# Patient Record
Sex: MALE | Race: BLACK OR AFRICAN AMERICAN | NOT HISPANIC OR LATINO | Employment: OTHER | URBAN - METROPOLITAN AREA
[De-identification: names, ages, dates, MRNs, and addresses within clinical notes are randomized per-mention and may not be internally consistent; named-entity substitution may affect disease eponyms.]

---

## 2018-01-21 ENCOUNTER — HOSPITAL ENCOUNTER (INPATIENT)
Facility: OTHER | Age: 68
LOS: 2 days | Discharge: HOME | DRG: 639 | End: 2018-01-24
Attending: EMERGENCY MEDICINE | Admitting: HOSPITALIST
Payer: MEDICARE

## 2018-01-21 DIAGNOSIS — R56.9 SEIZURE: ICD-10-CM

## 2018-01-21 DIAGNOSIS — R53.1 WEAKNESS: ICD-10-CM

## 2018-01-21 DIAGNOSIS — G40.909 SEIZURE DISORDER: ICD-10-CM

## 2018-01-21 DIAGNOSIS — T79.7XXA SUBCUTANEOUS EMPHYSEMA, INITIAL ENCOUNTER: Primary | ICD-10-CM

## 2018-01-21 DIAGNOSIS — D64.9 ANEMIA, UNSPECIFIED TYPE: ICD-10-CM

## 2018-01-21 DIAGNOSIS — E16.2 HYPOGLYCEMIA: ICD-10-CM

## 2018-01-21 DIAGNOSIS — E11.8 TYPE 2 DIABETES MELLITUS WITH COMPLICATION, WITHOUT LONG-TERM CURRENT USE OF INSULIN: ICD-10-CM

## 2018-01-21 DIAGNOSIS — R80.9 PROTEINURIA, UNSPECIFIED TYPE: ICD-10-CM

## 2018-01-21 PROBLEM — E78.5 HYPERLIPIDEMIA: Status: ACTIVE | Noted: 2018-01-21

## 2018-01-21 PROBLEM — D50.9 IRON DEFICIENCY ANEMIA: Status: ACTIVE | Noted: 2018-01-21

## 2018-01-21 PROBLEM — E11.9 TYPE 2 DIABETES MELLITUS: Status: ACTIVE | Noted: 2018-01-21

## 2018-01-21 PROBLEM — I95.9 HYPOTENSION: Status: ACTIVE | Noted: 2018-01-21

## 2018-01-21 LAB
ALBUMIN SERPL BCP-MCNC: 2.6 G/DL
ALP SERPL-CCNC: 67 U/L
ALT SERPL W/O P-5'-P-CCNC: 16 U/L
ANION GAP SERPL CALC-SCNC: 10 MMOL/L
ANISOCYTOSIS BLD QL SMEAR: SLIGHT
AST SERPL-CCNC: 26 U/L
BACTERIA #/AREA URNS HPF: NORMAL /HPF
BASOPHILS # BLD AUTO: 0.01 K/UL
BASOPHILS NFR BLD: 0.2 %
BILIRUB SERPL-MCNC: 0.1 MG/DL
BILIRUB UR QL STRIP: NEGATIVE
BNP SERPL-MCNC: 64 PG/ML
BUN SERPL-MCNC: 15 MG/DL
BURR CELLS BLD QL SMEAR: ABNORMAL
CALCIUM SERPL-MCNC: 8.9 MG/DL
CHLORIDE SERPL-SCNC: 109 MMOL/L
CLARITY UR: CLEAR
CO2 SERPL-SCNC: 24 MMOL/L
COLOR UR: YELLOW
CREAT SERPL-MCNC: 0.8 MG/DL
DIFFERENTIAL METHOD: ABNORMAL
EOSINOPHIL # BLD AUTO: 0.2 K/UL
EOSINOPHIL NFR BLD: 4 %
ERYTHROCYTE [DISTWIDTH] IN BLOOD BY AUTOMATED COUNT: 18.6 %
EST. GFR  (AFRICAN AMERICAN): >60 ML/MIN/1.73 M^2
EST. GFR  (NON AFRICAN AMERICAN): >60 ML/MIN/1.73 M^2
GIANT PLATELETS BLD QL SMEAR: PRESENT
GLUCOSE SERPL-MCNC: 53 MG/DL
GLUCOSE UR QL STRIP: ABNORMAL
HCT VFR BLD AUTO: 28.6 %
HGB BLD-MCNC: 9 G/DL
HGB UR QL STRIP: NEGATIVE
HYALINE CASTS #/AREA URNS LPF: 0 /LPF
HYPOCHROMIA BLD QL SMEAR: ABNORMAL
INR PPP: 1
KETONES UR QL STRIP: NEGATIVE
LEUKOCYTE ESTERASE UR QL STRIP: NEGATIVE
LYMPHOCYTES # BLD AUTO: 3 K/UL
LYMPHOCYTES NFR BLD: 52.6 %
MCH RBC QN AUTO: 23.8 PG
MCHC RBC AUTO-ENTMCNC: 31.5 G/DL
MCV RBC AUTO: 76 FL
MICROSCOPIC COMMENT: NORMAL
MONOCYTES # BLD AUTO: 0.5 K/UL
MONOCYTES NFR BLD: 8.9 %
NEUTROPHILS # BLD AUTO: 2 K/UL
NEUTROPHILS NFR BLD: 34.3 %
NITRITE UR QL STRIP: NEGATIVE
OVALOCYTES BLD QL SMEAR: ABNORMAL
PH UR STRIP: 7 [PH] (ref 5–8)
PLATELET # BLD AUTO: 147 K/UL
PLATELET BLD QL SMEAR: ABNORMAL
PMV BLD AUTO: 10.3 FL
POCT GLUCOSE: 113 MG/DL (ref 70–110)
POCT GLUCOSE: 142 MG/DL (ref 70–110)
POCT GLUCOSE: 196 MG/DL (ref 70–110)
POCT GLUCOSE: 53 MG/DL (ref 70–110)
POCT GLUCOSE: 66 MG/DL (ref 70–110)
POIKILOCYTOSIS BLD QL SMEAR: ABNORMAL
POLYCHROMASIA BLD QL SMEAR: ABNORMAL
POTASSIUM SERPL-SCNC: 4.3 MMOL/L
PROT SERPL-MCNC: 6.6 G/DL
PROT UR QL STRIP: ABNORMAL
PROTHROMBIN TIME: 11 SEC
RBC # BLD AUTO: 3.78 M/UL
RBC #/AREA URNS HPF: 0 /HPF (ref 0–4)
SCHISTOCYTES BLD QL SMEAR: ABNORMAL
SCHISTOCYTES BLD QL SMEAR: PRESENT
SODIUM SERPL-SCNC: 143 MMOL/L
SP GR UR STRIP: 1.02 (ref 1–1.03)
SPHEROCYTES BLD QL SMEAR: ABNORMAL
TROPONIN I SERPL DL<=0.01 NG/ML-MCNC: <0.006 NG/ML
URN SPEC COLLECT METH UR: ABNORMAL
UROBILINOGEN UR STRIP-ACNC: 1 EU/DL
VALPROATE SERPL-MCNC: 49 UG/ML
WBC # BLD AUTO: 5.76 K/UL
WBC #/AREA URNS HPF: 2 /HPF (ref 0–5)

## 2018-01-21 PROCEDURE — G0378 HOSPITAL OBSERVATION PER HR: HCPCS

## 2018-01-21 PROCEDURE — 82962 GLUCOSE BLOOD TEST: CPT

## 2018-01-21 PROCEDURE — 93005 ELECTROCARDIOGRAM TRACING: CPT

## 2018-01-21 PROCEDURE — 93010 ELECTROCARDIOGRAM REPORT: CPT | Mod: ,,, | Performed by: INTERNAL MEDICINE

## 2018-01-21 PROCEDURE — 83880 ASSAY OF NATRIURETIC PEPTIDE: CPT

## 2018-01-21 PROCEDURE — 63600175 PHARM REV CODE 636 W HCPCS: Performed by: NURSE PRACTITIONER

## 2018-01-21 PROCEDURE — 25000003 PHARM REV CODE 250: Performed by: NURSE PRACTITIONER

## 2018-01-21 PROCEDURE — 85610 PROTHROMBIN TIME: CPT

## 2018-01-21 PROCEDURE — 99223 1ST HOSP IP/OBS HIGH 75: CPT | Mod: AI,,, | Performed by: HOSPITALIST

## 2018-01-21 PROCEDURE — 80164 ASSAY DIPROPYLACETIC ACD TOT: CPT

## 2018-01-21 PROCEDURE — 99285 EMERGENCY DEPT VISIT HI MDM: CPT | Mod: 25

## 2018-01-21 PROCEDURE — 84484 ASSAY OF TROPONIN QUANT: CPT

## 2018-01-21 PROCEDURE — 25000003 PHARM REV CODE 250: Performed by: EMERGENCY MEDICINE

## 2018-01-21 PROCEDURE — 99220 PR INITIAL OBSERVATION CARE,LEVL III: CPT | Mod: ,,, | Performed by: NURSE PRACTITIONER

## 2018-01-21 PROCEDURE — 25000003 PHARM REV CODE 250

## 2018-01-21 PROCEDURE — 85025 COMPLETE CBC W/AUTO DIFF WBC: CPT

## 2018-01-21 PROCEDURE — 80053 COMPREHEN METABOLIC PANEL: CPT

## 2018-01-21 PROCEDURE — 96374 THER/PROPH/DIAG INJ IV PUSH: CPT

## 2018-01-21 PROCEDURE — 81000 URINALYSIS NONAUTO W/SCOPE: CPT

## 2018-01-21 RX ORDER — AMOXICILLIN 250 MG
1 CAPSULE ORAL 2 TIMES DAILY
Status: DISCONTINUED | OUTPATIENT
Start: 2018-01-21 | End: 2018-01-24 | Stop reason: HOSPADM

## 2018-01-21 RX ORDER — ESCITALOPRAM OXALATE 10 MG/1
10 TABLET ORAL DAILY
COMMUNITY

## 2018-01-21 RX ORDER — IBUPROFEN 200 MG
16 TABLET ORAL
Status: DISCONTINUED | OUTPATIENT
Start: 2018-01-21 | End: 2018-01-24 | Stop reason: HOSPADM

## 2018-01-21 RX ORDER — ENOXAPARIN SODIUM 100 MG/ML
40 INJECTION SUBCUTANEOUS EVERY 24 HOURS
Status: DISCONTINUED | OUTPATIENT
Start: 2018-01-21 | End: 2018-01-24 | Stop reason: HOSPADM

## 2018-01-21 RX ORDER — PANTOPRAZOLE SODIUM 40 MG/1
40 TABLET, DELAYED RELEASE ORAL DAILY
Status: DISCONTINUED | OUTPATIENT
Start: 2018-01-22 | End: 2018-01-24 | Stop reason: HOSPADM

## 2018-01-21 RX ORDER — ONDANSETRON 2 MG/ML
4 INJECTION INTRAMUSCULAR; INTRAVENOUS EVERY 8 HOURS PRN
Status: DISCONTINUED | OUTPATIENT
Start: 2018-01-21 | End: 2018-01-24 | Stop reason: HOSPADM

## 2018-01-21 RX ORDER — ROSUVASTATIN CALCIUM 40 MG/1
10 TABLET, COATED ORAL NIGHTLY
COMMUNITY

## 2018-01-21 RX ORDER — DEXTROSE MONOHYDRATE, SODIUM CHLORIDE, AND POTASSIUM CHLORIDE 50; 1.49; 9 G/1000ML; G/1000ML; G/1000ML
INJECTION, SOLUTION INTRAVENOUS CONTINUOUS
Status: DISCONTINUED | OUTPATIENT
Start: 2018-01-21 | End: 2018-01-24

## 2018-01-21 RX ORDER — SODIUM CHLORIDE 0.9 % (FLUSH) 0.9 %
5 SYRINGE (ML) INJECTION
Status: DISCONTINUED | OUTPATIENT
Start: 2018-01-21 | End: 2018-01-24 | Stop reason: HOSPADM

## 2018-01-21 RX ORDER — IBUPROFEN 200 MG
24 TABLET ORAL
Status: DISCONTINUED | OUTPATIENT
Start: 2018-01-21 | End: 2018-01-24 | Stop reason: HOSPADM

## 2018-01-21 RX ORDER — DIVALPROEX SODIUM 500 MG/1
500 TABLET, FILM COATED, EXTENDED RELEASE ORAL DAILY
COMMUNITY

## 2018-01-21 RX ORDER — HYDROCODONE BITARTRATE AND ACETAMINOPHEN 7.5; 325 MG/1; MG/1
1 TABLET ORAL EVERY 6 HOURS PRN
COMMUNITY

## 2018-01-21 RX ORDER — ESCITALOPRAM OXALATE 10 MG/1
10 TABLET ORAL DAILY
Status: DISCONTINUED | OUTPATIENT
Start: 2018-01-22 | End: 2018-01-24 | Stop reason: HOSPADM

## 2018-01-21 RX ORDER — ASPIRIN 81 MG/1
81 TABLET ORAL DAILY
COMMUNITY

## 2018-01-21 RX ORDER — PANTOPRAZOLE SODIUM 40 MG/1
40 TABLET, DELAYED RELEASE ORAL DAILY
COMMUNITY

## 2018-01-21 RX ORDER — MIDODRINE HYDROCHLORIDE 2.5 MG/1
2.5 TABLET ORAL 3 TIMES DAILY
COMMUNITY

## 2018-01-21 RX ORDER — CYPROHEPTADINE HYDROCHLORIDE 4 MG/1
4 TABLET ORAL 3 TIMES DAILY PRN
COMMUNITY

## 2018-01-21 RX ORDER — DIVALPROEX SODIUM 500 MG/1
500 TABLET, FILM COATED, EXTENDED RELEASE ORAL DAILY
Status: DISCONTINUED | OUTPATIENT
Start: 2018-01-22 | End: 2018-01-24 | Stop reason: HOSPADM

## 2018-01-21 RX ORDER — TAMSULOSIN HYDROCHLORIDE 0.4 MG/1
0.4 CAPSULE ORAL DAILY
COMMUNITY

## 2018-01-21 RX ORDER — HYDROCODONE BITARTRATE AND ACETAMINOPHEN 7.5; 325 MG/1; MG/1
1 TABLET ORAL EVERY 6 HOURS PRN
Status: DISCONTINUED | OUTPATIENT
Start: 2018-01-21 | End: 2018-01-24 | Stop reason: HOSPADM

## 2018-01-21 RX ORDER — TAMSULOSIN HYDROCHLORIDE 0.4 MG/1
0.4 CAPSULE ORAL DAILY
Status: DISCONTINUED | OUTPATIENT
Start: 2018-01-22 | End: 2018-01-24 | Stop reason: HOSPADM

## 2018-01-21 RX ORDER — FERROUS SULFATE 325(65) MG
325 TABLET, DELAYED RELEASE (ENTERIC COATED) ORAL DAILY
Status: DISCONTINUED | OUTPATIENT
Start: 2018-01-22 | End: 2018-01-24 | Stop reason: HOSPADM

## 2018-01-21 RX ORDER — INSULIN ASPART 100 [IU]/ML
1-10 INJECTION, SOLUTION INTRAVENOUS; SUBCUTANEOUS
Status: DISCONTINUED | OUTPATIENT
Start: 2018-01-21 | End: 2018-01-23

## 2018-01-21 RX ORDER — GLUCAGON 1 MG
1 KIT INJECTION
Status: DISCONTINUED | OUTPATIENT
Start: 2018-01-21 | End: 2018-01-24 | Stop reason: HOSPADM

## 2018-01-21 RX ORDER — MIDODRINE HYDROCHLORIDE 2.5 MG/1
2.5 TABLET ORAL 3 TIMES DAILY
Status: DISCONTINUED | OUTPATIENT
Start: 2018-01-21 | End: 2018-01-24 | Stop reason: HOSPADM

## 2018-01-21 RX ORDER — GABAPENTIN 300 MG/1
300 CAPSULE ORAL 3 TIMES DAILY
Status: DISCONTINUED | OUTPATIENT
Start: 2018-01-21 | End: 2018-01-24 | Stop reason: HOSPADM

## 2018-01-21 RX ORDER — ROSUVASTATIN CALCIUM 10 MG/1
10 TABLET, COATED ORAL NIGHTLY
Status: DISCONTINUED | OUTPATIENT
Start: 2018-01-21 | End: 2018-01-24 | Stop reason: HOSPADM

## 2018-01-21 RX ORDER — GABAPENTIN 300 MG/1
300 CAPSULE ORAL DAILY
COMMUNITY

## 2018-01-21 RX ORDER — ACETAMINOPHEN 325 MG/1
650 TABLET ORAL EVERY 4 HOURS PRN
Status: DISCONTINUED | OUTPATIENT
Start: 2018-01-21 | End: 2018-01-24 | Stop reason: HOSPADM

## 2018-01-21 RX ORDER — DIVALPROEX SODIUM 500 MG/1
1000 TABLET, FILM COATED, EXTENDED RELEASE ORAL NIGHTLY
Status: DISCONTINUED | OUTPATIENT
Start: 2018-01-21 | End: 2018-01-24 | Stop reason: HOSPADM

## 2018-01-21 RX ORDER — ASPIRIN 81 MG/1
81 TABLET ORAL DAILY
Status: DISCONTINUED | OUTPATIENT
Start: 2018-01-22 | End: 2018-01-24 | Stop reason: HOSPADM

## 2018-01-21 RX ORDER — DEXTROSE 50 % IN WATER (D50W) INTRAVENOUS SYRINGE
Status: COMPLETED
Start: 2018-01-21 | End: 2018-01-21

## 2018-01-21 RX ORDER — QUINIDINE GLUCONATE 324 MG
240 TABLET, EXTENDED RELEASE ORAL 2 TIMES DAILY
COMMUNITY

## 2018-01-21 RX ORDER — METFORMIN HYDROCHLORIDE 1000 MG/1
1000 TABLET ORAL 2 TIMES DAILY WITH MEALS
Status: ON HOLD | COMMUNITY
End: 2018-01-24 | Stop reason: HOSPADM

## 2018-01-21 RX ADMIN — STANDARDIZED SENNA CONCENTRATE AND DOCUSATE SODIUM 1 TABLET: 8.6; 5 TABLET, FILM COATED ORAL at 10:01

## 2018-01-21 RX ADMIN — GABAPENTIN 300 MG: 300 CAPSULE ORAL at 10:01

## 2018-01-21 RX ADMIN — ENOXAPARIN SODIUM 40 MG: 100 INJECTION SUBCUTANEOUS at 10:01

## 2018-01-21 RX ADMIN — MIDODRINE HYDROCHLORIDE 2.5 MG: 2.5 TABLET ORAL at 10:01

## 2018-01-21 RX ADMIN — DEXTROSE MONOHYDRATE 50 ML: 25 INJECTION, SOLUTION INTRAVENOUS at 02:01

## 2018-01-21 RX ADMIN — DEXTROSE MONOHYDRATE 12.5 G: 25 INJECTION, SOLUTION INTRAVENOUS at 05:01

## 2018-01-21 RX ADMIN — POTASSIUM CHLORIDE, DEXTROSE MONOHYDRATE AND SODIUM CHLORIDE: 150; 5; 900 INJECTION, SOLUTION INTRAVENOUS at 10:01

## 2018-01-21 RX ADMIN — DIVALPROEX SODIUM 1000 MG: 500 TABLET, FILM COATED, EXTENDED RELEASE ORAL at 10:01

## 2018-01-21 NOTE — ED TRIAGE NOTES
Pt's wife reports that their son stated that the pt was confused, weak & unable to stand. Pt states that he remembers being on the floor, does not remember why he was on the floor. Last memory pt reports is attempting to stand. Pt's wife reports that she took his blood pressure when she returned home & pt's BP = 75 systolic with slurred speech. Pt's wife reports that pt has seizures. Pt c/o dizziness. Pt also c/o lower back & bilateral lower extremity pain.

## 2018-01-21 NOTE — ED NOTES
"Pt appears aaox4, speaking in clear, full sentences and able to follow commands at this time. Pt states," I feel so much better,. I didn't know my sugar was that low, I don't think it's been that low before". Pt remains on cardiac monitor, continuous pulse oximetry and automatic blood pressure cuff cycling w/ alarms set. Wife remains at bedside w/ pt.Bed placed in low locked position, side rails up x 2, call light is within reach of patient or family, alarms set and turned on for monitor and pulse ox, will continue to monitor    "

## 2018-01-21 NOTE — ED NOTES
"Pt sitting up talking with family member at bedside. Pt appears aaox4, speaking with clear full sentences at this time. " I just feel so much better. I am hungry though". Awaiting further orders from MD at this time. Will continue to monitor pt. Wife remains at bedside. Pt remains on cardiac monitor, continuous pulse oximetry and automatic blood pressure cuff cycling w/ alarms set. Bed placed in low locked position, side rails up x 2, call light is within reach of patient or family, alarms set and turned on for monitor and pulse ox, will continue to monitor.     "

## 2018-01-21 NOTE — ED NOTES
MD aware of pt's current CBG 66, per MD given PO [place] [employer] at this time. MD placing diet order for pt.

## 2018-01-21 NOTE — ED NOTES
Dr. Toney at bedside speaking with patient and family about current results and updated plan of care.

## 2018-01-21 NOTE — ED NOTES
Patient identifiers verified and correct for Campbell Costa.    LOC: The patient is awake, alert and aware of environment with an appropriate affect, the patient is oriented x 3 and speaking appropriately.  APPEARANCE: Patient resting comfortably and in no acute distress, patient is clean and well groomed, patient's clothing is properly fastened.  SKIN: The skin is warm and dry, color consistent with ethnicity, patient has normal skin turgor and moist mucus membranes, skin intact, no breakdown or bruising noted.  MUSCULOSKELETAL: Patient moving all extremities spontaneously, no obvious swelling or deformities noted.  RESPIRATORY: Airway is open and patent, respirations are spontaneous, patient has a normal effort and rate, no accessory muscle use noted, bilateral breath sounds clear in all lobes.  CARDIAC: Patient has a normal rate and regular rhythm, no periphreal edema noted, capillary refill < 3 seconds.  ABDOMEN: Soft and non tender to palpation, no distention noted.  NEUROLOGIC: PERRL,3mm bilaterally, eyes open spontaneously, behavior appropriate to situation, follows commands, facial expression symmetrical, bilateral hand grasp equal and even, purposeful motor response noted, normal sensation in all extremities when touched with a finger.

## 2018-01-21 NOTE — ED PROVIDER NOTES
Encounter Date: 1/21/2018    SCRIBE #1 NOTE: I, Loli Nash, am scribing for, and in the presence of, Dr. Ortez.       History     Chief Complaint   Patient presents with    Fatigue     starting today, chronic back pain and dizziness w/ fall x3 in last 7 months     Time seen by provider: 1:58 PM    This is a 67 y.o. male with a history of COPD, Dm, HTN, seizures, and stroke who presents with complaint of fatigue that began today. Patient reports wife found him on the floor. Patient is unsure of how he got there. Wife reports patient was confused after the episode. While he was on the floor, wife reports patient had low blood pressure (75/48). Wife reports patient seems less responsive that usual. He denies any fever, chills, congestion, cough, chest pain, nausea, vomiting, or dysuria. Wife reports giving the patient morning medications and a muscle relaxer for back pain prior to the episode. Patient reports keppra was increased about three weeks ago.         The history is provided by the patient.     Review of patient's allergies indicates:   Allergen Reactions    Lisinopril      Past Medical History:   Diagnosis Date    Anxiety     COPD (chronic obstructive pulmonary disease)     Diabetes mellitus     GERD (gastroesophageal reflux disease)     Hereditary acanthocytosis     Hypercholesterolemia     Hypertension     Iron deficiency anemia     Seizures     Stroke      Past Surgical History:   Procedure Laterality Date    CERVICAL FUSION      THYROIDECTOMY, PARTIAL Left     November 2017    TUMOR EXCISION      Fatty tumor excised from the right jaw     History reviewed. No pertinent family history.  Social History   Substance Use Topics    Smoking status: Former Smoker     Quit date: 1/21/2014    Smokeless tobacco: Never Used      Comment: Pt's wife reports pt is a recovering alcoholic.    Alcohol use No     Review of Systems   Constitutional: Positive for fatigue. Negative for chills and fever.    HENT: Negative for congestion and sore throat.    Eyes: Negative for photophobia and redness.   Respiratory: Negative for cough and shortness of breath.    Cardiovascular: Negative for chest pain.   Gastrointestinal: Negative for abdominal pain, nausea and vomiting.   Genitourinary: Negative for dysuria.   Musculoskeletal: Positive for back pain.   Skin: Negative for rash.   Neurological: Negative for weakness, light-headedness and headaches.   Psychiatric/Behavioral: Negative for confusion.       Physical Exam     Initial Vitals [01/21/18 1327]   BP Pulse Resp Temp SpO2   121/69 63 18 96.6 °F (35.9 °C) 98 %      MAP       86.33         Physical Exam    Nursing note and vitals reviewed.  Constitutional: He appears well-developed and well-nourished. He is not diaphoretic. No distress.   HENT:   Head: Normocephalic and atraumatic.   Mouth/Throat: Oropharynx is clear and moist.   Eyes: Conjunctivae and EOM are normal. Pupils are equal, round, and reactive to light.   Neck: Normal range of motion. Neck supple.   Cardiovascular: Normal rate, regular rhythm, S1 normal, S2 normal and normal heart sounds. Exam reveals no gallop and no friction rub.    No murmur heard.  Pulmonary/Chest: Breath sounds normal. No respiratory distress. He has no wheezes. He has no rhonchi. He has no rales.   Abdominal: Soft. Bowel sounds are normal. There is no tenderness. There is no rebound and no guarding.   Musculoskeletal: Normal range of motion. He exhibits no edema or tenderness.   No lower extremity edema.    Lymphadenopathy:     He has no cervical adenopathy.   Neurological: He is alert and oriented to person, place, and time.   No new focal neurological deficits.    Skin: Skin is warm and dry. Capillary refill takes less than 2 seconds. No rash noted. No pallor.   Psychiatric: He has a normal mood and affect. His behavior is normal. Judgment and thought content normal.         ED Course   Procedures  Labs Reviewed   VALPROIC ACID -  Abnormal; Notable for the following:        Result Value    Valproic Acid Lvl 49.0 (*)     All other components within normal limits   CBC W/ AUTO DIFFERENTIAL - Abnormal; Notable for the following:     RBC 3.78 (*)     Hemoglobin 9.0 (*)     Hematocrit 28.6 (*)     MCV 76 (*)     MCH 23.8 (*)     MCHC 31.5 (*)     RDW 18.6 (*)     Platelets 147 (*)     Gran% 34.3 (*)     Lymph% 52.6 (*)     All other components within normal limits   COMPREHENSIVE METABOLIC PANEL - Abnormal; Notable for the following:     Glucose 53 (*)     Albumin 2.6 (*)     All other components within normal limits   URINALYSIS - Abnormal; Notable for the following:     Protein, UA 2+ (*)     Glucose, UA Trace (*)     All other components within normal limits   POCT GLUCOSE - Abnormal; Notable for the following:     POCT Glucose 53 (*)     All other components within normal limits   POCT GLUCOSE - Abnormal; Notable for the following:     POCT Glucose 142 (*)     All other components within normal limits   POCT GLUCOSE - Abnormal; Notable for the following:     POCT Glucose 66 (*)     All other components within normal limits   POCT GLUCOSE - Abnormal; Notable for the following:     POCT Glucose 196 (*)     All other components within normal limits   PROTIME-INR   TROPONIN I   B-TYPE NATRIURETIC PEPTIDE   URINALYSIS MICROSCOPIC   POCT GLUCOSE MONITORING CONTINUOUS        Imaging Results          CT Chest Without Contrast (Final result)  Result time 01/21/18 16:35:49    Final result by Christopher Rapp MD (01/21/18 16:35:49)                 Impression:        1. Redemonstration of scattered gas foci throughout the superficial and also deep venous structures of the imaged extracranial soft tissues, bilateral  spaces, bilateral cavernous sinuses and upper neck, anterior lower left neck, left subclavian vein and pulmonary trunk. Given the slight predominance on the left and also seen within the pulmonary trunk and left subclavian vein  without identifiable cause such as sphenoid fracture or pneumomediastinum, this may be related to a current or recent indwelling venous catheter. Correlate clinically.    2. Otherwise, no acute neck soft tissue or chest abnormality allowing for lack of intravenous contrast.    3. Absent left thyroid lobe which may be related to prior surgery. Right thyroid subcentimeter low attenuation focus which may represent a nodule. Further evaluation with elective/nonemergent thyroid ultrasound can be obtained as warranted.    4. Additional findings as above.      Electronically signed by: JULIA REED MD, MD  Date:     01/21/18  Time:    16:35              Narrative:    Technique:  Axial images were obtained through the cervical soft tissues and chest without intravenous contrast. Coronal and sagittal reformatted images were generated.    Comparison: Head CT and chest radiograph earlier same day    Findings: Please note that the lack of intravenous contrast limits evaluation of soft tissue and vascular structures.    CT soft tissue neck:  Once again, scattered gas foci throughout the superficial and also deep presumed venous structures of the imaged extracranial soft tissues, bilateral  spaces, bilateral cavernous sinuses and upper neck. Slightly more predominant on the left.    There is mucosal thickening within the left maxillary sinus.  No air fluid levels within the sinuses.  The remaining visualized paranasal sinuses and mastoid air cells are clear.    The orbits and orbital contents appear within normal limits.  The globes are symmetric and the ocular lenses are anteriorly located.  No acute fracture or dislocation.  There is mild leftward deviation of the nasal septum .  Bilateral mandibular condyles and TMJs appear relatively symmetric and intact.  The maxilla is edentulous. Several dental caries noted of the remaining anterior mandibular teeth.    The base of the tongue appears within normal limits.  No  peritonsillar, parapharyngeal or retropharyngeal discrete mass or fluid collections identified, allowing for lack of intravenous contrast.  The airway is relatively midline and overall patent.  Epiglottis and uvula appear normal. The bilateral valleculae and piriform sinuses appear within normal limits.  The aryepiglottic folds are within normal limits.  The true and false vocal cords are grossly within normal limits.  No prevertebral soft tissue swelling or paraspinal hematoma.    The bilateral parotid, submandibular, and sublingual glands are normal in appearance.  There are scattered normal sized lymph nodes noted throughout the cervical region and submental and submandibular regions, but not enlarged by CT criteria.   No subcutaneous emphysema or radiodense foreign body.    Surgical changes of ACDF at C3-C6 levels without evidence of hardware failure or loosening. Multilevel mild to moderate degenerative change of the cervical spine. No acute displaced fracture or dislocation. Bilateral inferior frontal lobe and right parieto-occipital watershed zone encephalomalacia partially imaged. No acute findings seen within the included intracranial contents.      CT chest: A few gas foci seen within anterior lower left neck and left subclavian presumed venous structures as well as anterior aspect of the pulmonary trunk.    Left thyroid lobe is not identified and likely surgically absent. Subcentimeter low-attenuation parenchymal focus within the anterior right thyroid lobe.    Extrathoracic soft tissues are within normal limits.    There is a left-sided arch. Minimal scattered atherosclerosis. The aorta is non-aneurysmal. Heart is normal in size without significant pericardial fluid. Mild coronary atherosclerosis. Minimal aortic valvular calcification. Esophagus is normal in course and caliber. No mediastinal, hilar or axillary lymphadenopathy. Pulmonary trunk is upper limits of normal in caliber. No  pneumomediastinum.    The lungs demonstrate scattered areas of platelike scarring versus atelectasis primarily within the right lung. Mild dependent atelectasis, right or than left. There is mild architectural distortion with reticulation seen along the posterior lateral aspect of the right lung. No large consolidation, pleural effusion or pneumothorax. No suspicious pulmonary nodules. There is bilateral mild bronchiectasis, slightly more prominent on the right. Mild subpleural cystic change noted bilaterally.    Included upper abdomen is within normal limits. No acute displaced fracture, dislocation or destructive osseous process. Age-related degenerative change.                             CT Soft Tissue Neck WO Contrast (Final result)  Result time 01/21/18 16:35:49    Final result by Julia Rapp MD (01/21/18 16:35:49)                 Impression:        1. Redemonstration of scattered gas foci throughout the superficial and also deep venous structures of the imaged extracranial soft tissues, bilateral  spaces, bilateral cavernous sinuses and upper neck, anterior lower left neck, left subclavian vein and pulmonary trunk. Given the slight predominance on the left and also seen within the pulmonary trunk and left subclavian vein without identifiable cause such as sphenoid fracture or pneumomediastinum, this may be related to a current or recent indwelling venous catheter. Correlate clinically.    2. Otherwise, no acute neck soft tissue or chest abnormality allowing for lack of intravenous contrast.    3. Absent left thyroid lobe which may be related to prior surgery. Right thyroid subcentimeter low attenuation focus which may represent a nodule. Further evaluation with elective/nonemergent thyroid ultrasound can be obtained as warranted.    4. Additional findings as above.      Electronically signed by: JULIA RAPP MD, MD  Date:     01/21/18  Time:    16:35              Narrative:    Technique:  Axial  images were obtained through the cervical soft tissues and chest without intravenous contrast. Coronal and sagittal reformatted images were generated.    Comparison: Head CT and chest radiograph earlier same day    Findings: Please note that the lack of intravenous contrast limits evaluation of soft tissue and vascular structures.    CT soft tissue neck:  Once again, scattered gas foci throughout the superficial and also deep presumed venous structures of the imaged extracranial soft tissues, bilateral  spaces, bilateral cavernous sinuses and upper neck. Slightly more predominant on the left.    There is mucosal thickening within the left maxillary sinus.  No air fluid levels within the sinuses.  The remaining visualized paranasal sinuses and mastoid air cells are clear.    The orbits and orbital contents appear within normal limits.  The globes are symmetric and the ocular lenses are anteriorly located.  No acute fracture or dislocation.  There is mild leftward deviation of the nasal septum .  Bilateral mandibular condyles and TMJs appear relatively symmetric and intact.  The maxilla is edentulous. Several dental caries noted of the remaining anterior mandibular teeth.    The base of the tongue appears within normal limits.  No peritonsillar, parapharyngeal or retropharyngeal discrete mass or fluid collections identified, allowing for lack of intravenous contrast.  The airway is relatively midline and overall patent.  Epiglottis and uvula appear normal. The bilateral valleculae and piriform sinuses appear within normal limits.  The aryepiglottic folds are within normal limits.  The true and false vocal cords are grossly within normal limits.  No prevertebral soft tissue swelling or paraspinal hematoma.    The bilateral parotid, submandibular, and sublingual glands are normal in appearance.  There are scattered normal sized lymph nodes noted throughout the cervical region and submental and submandibular  regions, but not enlarged by CT criteria.   No subcutaneous emphysema or radiodense foreign body.    Surgical changes of ACDF at C3-C6 levels without evidence of hardware failure or loosening. Multilevel mild to moderate degenerative change of the cervical spine. No acute displaced fracture or dislocation. Bilateral inferior frontal lobe and right parieto-occipital watershed zone encephalomalacia partially imaged. No acute findings seen within the included intracranial contents.      CT chest: A few gas foci seen within anterior lower left neck and left subclavian presumed venous structures as well as anterior aspect of the pulmonary trunk.    Left thyroid lobe is not identified and likely surgically absent. Subcentimeter low-attenuation parenchymal focus within the anterior right thyroid lobe.    Extrathoracic soft tissues are within normal limits.    There is a left-sided arch. Minimal scattered atherosclerosis. The aorta is non-aneurysmal. Heart is normal in size without significant pericardial fluid. Mild coronary atherosclerosis. Minimal aortic valvular calcification. Esophagus is normal in course and caliber. No mediastinal, hilar or axillary lymphadenopathy. Pulmonary trunk is upper limits of normal in caliber. No pneumomediastinum.    The lungs demonstrate scattered areas of platelike scarring versus atelectasis primarily within the right lung. Mild dependent atelectasis, right or than left. There is mild architectural distortion with reticulation seen along the posterior lateral aspect of the right lung. No large consolidation, pleural effusion or pneumothorax. No suspicious pulmonary nodules. There is bilateral mild bronchiectasis, slightly more prominent on the right. Mild subpleural cystic change noted bilaterally.    Included upper abdomen is within normal limits. No acute displaced fracture, dislocation or destructive osseous process. Age-related degenerative change.                             X-Ray  Chest AP Portable (Final result)  Result time 01/21/18 14:41:45    Final result by Marshall Harley MD (01/21/18 14:41:45)                 Impression:     No acute cardiopulmonary process.      Electronically signed by: MARSHALL HARLEY MD  Date:     01/21/18  Time:    14:41              Narrative:    Chest x-ray, 1 view    Comparison: None.    Findings: ACDF hardware noted. There is no consolidation, effusion, or pneumothorax.  Cardiomediastinal silhouette is unremarkable.                             CT Head Without Contrast (Final result)  Result time 01/21/18 14:47:37    Final result by Julia Rapp MD (01/21/18 14:47:37)                 Impression:        1.  No acute intracranial findings identified. Specifically, no intracranial hemorrhage.    2.  Bilateral frontal lobe and right parieto-occipital watershed zone encephalomalacia suggesting sequela of remote infarct and/or trauma.    3. Senescent changes as above.    4.  Diffuse gas foci throughout the presumed venous system of the extracranial soft tissues and also bilateral cavernous sinuses, presumably related to air introduced via an indwelling catheter. Correlate clinically.        Electronically signed by: JULIA RAPP MD, MD  Date:     01/21/18  Time:    14:47              Narrative:    COMPARISON: None    FINDINGS: Inferior bilateral frontal lobe encephalomalacia, left greater than right and right parietal occipital watershed zone encephalomalacia. 5 mm coarse calcification within the right parietal watershed zone region of encephalomalacia, nonspecific. There is compensatory dilatation of the bilateral frontal horns and right posterior horn of the lateral ventricles. The ventricles are otherwise midline without distortion by mass effect.    No convincing evidence of hemorrhage, mass, midline shift or acute major vascular territory infarct.  There is superimposed age appropriate  generalized cerebral atrophy.  There are patchy and confluent low  attenuation changes throughout the subcortical and periventricular white matter, nonspecific but can be seen with chronic small vessel ischemic disease.  No extra-axial hemorrhage or mass. Skull base  vascular calcifications are noted.    Diffuse gas foci throughout the presumed venous system of the extracranial soft tissues and also bilateral cavernous sinuses.     The extracranial structures are otherwise within normal limits.  Calvarium is intact.  Visualized paranasal sinuses are clear.  Mastoid air cells are clear.                                 Medical Decision Making:   Independently Interpreted Test(s):   I have ordered and independently interpreted X-rays - see prior notes.  I have ordered and independently interpreted EKG Reading(s) - see prior notes  Clinical Tests:   Lab Tests: Ordered and Reviewed  Radiological Study: Ordered and Reviewed  Medical Tests: Ordered and Reviewed  ED Management:  2:47 PM When nurse Heather when into ct scan to get blood and noticed patient was more fatigued with almost lethargic and slowed speech. Nurse checked blood sugar and it was 50. an amp of d50 was administered and patient became more alert.    6:49 PM Consulted and discussed with Cameron THOMAS), who will admit the patient to Dr. Hartman.               Attending Attestation:           Physician Attestation for Scribe:  Physician Attestation Statement for Scribe #1: I, Dr. Oretz, reviewed documentation, as scribed by Loli Nash in my presence, and it is both accurate and complete.         Attending ED Notes:   Emergent evaluation of 67-year-old male found extra his bedroom the floor after possible seizure activity.  Patient had confusion when he was found on the floor.  Patient is afebrile, nontoxic-appearing with stable vital signs.  Patient is neurovascularly intact without focal neurologic deficits.PERRLA, EOMI.  Strength 5 of 5 throughout.  Two point discrimination is intact throughout.  Sensation intact to  light touch throughout.  Reflexes 2+ throughout.  Good finger to nose task ability. Negative pronator drift.  No papilledema.  No meningeal signs.  There is no elevation of white blood cell count.  H&H is 9.0 128.6.  No acute findings on CMP.  Valproic acid level is 49.  No acute findings on chest x-ray.  CT of the head reveals diffuse foci of air throughout the presumed venous system of the extracranial soft tissues and bilateral cavernous sinuses.  Consulted and discussed this finding with the Radiologist who recommended CT of chest and neck.  CT of neck reveals scattered gas foci in the bilateral cavernous sinuses and upper neck, anterior lower left neck and left subclavian vein and pulmonary trunk.  Possible right thyroid nodule.  Unable to determine why patient has venous foci of air.  During the course of patient's stay in the emergency room he was found to have a blood glucose less than 60.  Patient was administered an amp of D50.  Patient later was determined to have low blood glucose again.  Patient was again administered D50 and given food to eat.  Patient will be monitored for hypoglycemic reactions.  The patient is extensive the counseled on his diagnosis and treatment including all diagnostic, laboratory and physical exam findings.  The patient is admitted in stable condition.          ED Course      Clinical Impression:     1. Subcutaneous emphysema, initial encounter    2. Weakness    3. Seizure    4. Anemia, unspecified type    5. Proteinuria, unspecified type    6. Hypoglycemia                               Pernell Toney MD  01/21/18 4055

## 2018-01-21 NOTE — ED NOTES
"Pt sitting up speaking with wife at bedside. " I am starting to feel better. I was just feeling a little funny a few minutes ago like my sugar was dropping again". remains aaox4, speaking in clear full sentences. Respirations remain spontanoues even and non labored w/ no distress noted. Pt denies current pain or needs at this time. Pt sitting up drink PO Orange Juice and eating sandwich currently. Wife remains at bedside. Pt remains on cardiac monitor, continuous pulse oximetry and automatic blood pressure cuff cycling w/ alarms set. Bed placed in low locked position, side rails up x 2, call light is within reach of patient or family, alarms set and turned on for monitor and pulse ox, will continue to monitor.     "

## 2018-01-22 LAB
ALBUMIN SERPL BCP-MCNC: 2.4 G/DL
ALP SERPL-CCNC: 65 U/L
ALT SERPL W/O P-5'-P-CCNC: 20 U/L
ANION GAP SERPL CALC-SCNC: 8 MMOL/L
AST SERPL-CCNC: 27 U/L
BASOPHILS # BLD AUTO: 0.01 K/UL
BASOPHILS NFR BLD: 0.2 %
BILIRUB SERPL-MCNC: 0.2 MG/DL
BUN SERPL-MCNC: 13 MG/DL
CALCIUM SERPL-MCNC: 9 MG/DL
CHLORIDE SERPL-SCNC: 109 MMOL/L
CHOLEST SERPL-MCNC: 128 MG/DL
CHOLEST/HDLC SERPL: 4 {RATIO}
CO2 SERPL-SCNC: 26 MMOL/L
CREAT SERPL-MCNC: 0.8 MG/DL
DIFFERENTIAL METHOD: ABNORMAL
EOSINOPHIL # BLD AUTO: 0.2 K/UL
EOSINOPHIL NFR BLD: 4.1 %
ERYTHROCYTE [DISTWIDTH] IN BLOOD BY AUTOMATED COUNT: 18.7 %
EST. GFR  (AFRICAN AMERICAN): >60 ML/MIN/1.73 M^2
EST. GFR  (NON AFRICAN AMERICAN): >60 ML/MIN/1.73 M^2
ESTIMATED AVG GLUCOSE: 128 MG/DL
GLUCOSE SERPL-MCNC: 110 MG/DL
HBA1C MFR BLD HPLC: 6.1 %
HCT VFR BLD AUTO: 28.5 %
HDLC SERPL-MCNC: 32 MG/DL
HDLC SERPL: 25 %
HGB BLD-MCNC: 9.1 G/DL
LDLC SERPL CALC-MCNC: 81.8 MG/DL
LYMPHOCYTES # BLD AUTO: 2.7 K/UL
LYMPHOCYTES NFR BLD: 54.6 %
MAGNESIUM SERPL-MCNC: 1.5 MG/DL
MCH RBC QN AUTO: 23.9 PG
MCHC RBC AUTO-ENTMCNC: 31.9 G/DL
MCV RBC AUTO: 75 FL
MONOCYTES # BLD AUTO: 0.4 K/UL
MONOCYTES NFR BLD: 9 %
NEUTROPHILS # BLD AUTO: 1.6 K/UL
NEUTROPHILS NFR BLD: 31.9 %
NONHDLC SERPL-MCNC: 96 MG/DL
PHOSPHATE SERPL-MCNC: 3 MG/DL
PLATELET # BLD AUTO: 156 K/UL
PMV BLD AUTO: 10.1 FL
POCT GLUCOSE: 114 MG/DL (ref 70–110)
POCT GLUCOSE: 146 MG/DL (ref 70–110)
POCT GLUCOSE: 160 MG/DL (ref 70–110)
POCT GLUCOSE: 86 MG/DL (ref 70–110)
POTASSIUM SERPL-SCNC: 4.2 MMOL/L
PROT SERPL-MCNC: 6.4 G/DL
RBC # BLD AUTO: 3.81 M/UL
SODIUM SERPL-SCNC: 143 MMOL/L
TRIGL SERPL-MCNC: 71 MG/DL
WBC # BLD AUTO: 4.87 K/UL

## 2018-01-22 PROCEDURE — 63600175 PHARM REV CODE 636 W HCPCS: Performed by: NURSE PRACTITIONER

## 2018-01-22 PROCEDURE — 83735 ASSAY OF MAGNESIUM: CPT

## 2018-01-22 PROCEDURE — 11000001 HC ACUTE MED/SURG PRIVATE ROOM

## 2018-01-22 PROCEDURE — 25000003 PHARM REV CODE 250: Performed by: NURSE PRACTITIONER

## 2018-01-22 PROCEDURE — 80053 COMPREHEN METABOLIC PANEL: CPT

## 2018-01-22 PROCEDURE — 83036 HEMOGLOBIN GLYCOSYLATED A1C: CPT

## 2018-01-22 PROCEDURE — 99221 1ST HOSP IP/OBS SF/LOW 40: CPT | Mod: ,,, | Performed by: PSYCHIATRY & NEUROLOGY

## 2018-01-22 PROCEDURE — 80061 LIPID PANEL: CPT

## 2018-01-22 PROCEDURE — 85025 COMPLETE CBC W/AUTO DIFF WBC: CPT

## 2018-01-22 PROCEDURE — 63600175 PHARM REV CODE 636 W HCPCS: Performed by: HOSPITALIST

## 2018-01-22 PROCEDURE — 36415 COLL VENOUS BLD VENIPUNCTURE: CPT

## 2018-01-22 PROCEDURE — 84100 ASSAY OF PHOSPHORUS: CPT

## 2018-01-22 RX ORDER — MAGNESIUM SULFATE HEPTAHYDRATE 40 MG/ML
2 INJECTION, SOLUTION INTRAVENOUS ONCE
Status: COMPLETED | OUTPATIENT
Start: 2018-01-22 | End: 2018-01-22

## 2018-01-22 RX ADMIN — POTASSIUM CHLORIDE, DEXTROSE MONOHYDRATE AND SODIUM CHLORIDE: 150; 5; 900 INJECTION, SOLUTION INTRAVENOUS at 10:01

## 2018-01-22 RX ADMIN — GABAPENTIN 300 MG: 300 CAPSULE ORAL at 10:01

## 2018-01-22 RX ADMIN — ROSUVASTATIN CALCIUM 10 MG: 10 TABLET, FILM COATED ORAL at 10:01

## 2018-01-22 RX ADMIN — STANDARDIZED SENNA CONCENTRATE AND DOCUSATE SODIUM 1 TABLET: 8.6; 5 TABLET, FILM COATED ORAL at 09:01

## 2018-01-22 RX ADMIN — MIDODRINE HYDROCHLORIDE 2.5 MG: 2.5 TABLET ORAL at 05:01

## 2018-01-22 RX ADMIN — PANTOPRAZOLE SODIUM 40 MG: 40 TABLET, DELAYED RELEASE ORAL at 09:01

## 2018-01-22 RX ADMIN — DIVALPROEX SODIUM 1000 MG: 500 TABLET, FILM COATED, EXTENDED RELEASE ORAL at 10:01

## 2018-01-22 RX ADMIN — STANDARDIZED SENNA CONCENTRATE AND DOCUSATE SODIUM 1 TABLET: 8.6; 5 TABLET, FILM COATED ORAL at 10:01

## 2018-01-22 RX ADMIN — TAMSULOSIN HYDROCHLORIDE 0.4 MG: 0.4 CAPSULE ORAL at 09:01

## 2018-01-22 RX ADMIN — ASPIRIN 81 MG: 81 TABLET, COATED ORAL at 09:01

## 2018-01-22 RX ADMIN — MAGNESIUM SULFATE IN WATER 2 G: 40 INJECTION, SOLUTION INTRAVENOUS at 12:01

## 2018-01-22 RX ADMIN — FERROUS SULFATE TAB EC 325 MG (65 MG FE EQUIVALENT) 325 MG: 325 (65 FE) TABLET DELAYED RESPONSE at 09:01

## 2018-01-22 RX ADMIN — HYDROCODONE BITARTRATE AND ACETAMINOPHEN 1 TABLET: 7.5; 325 TABLET ORAL at 09:01

## 2018-01-22 RX ADMIN — MIDODRINE HYDROCHLORIDE 2.5 MG: 2.5 TABLET ORAL at 10:01

## 2018-01-22 RX ADMIN — POTASSIUM CHLORIDE, DEXTROSE MONOHYDRATE AND SODIUM CHLORIDE: 150; 5; 900 INJECTION, SOLUTION INTRAVENOUS at 09:01

## 2018-01-22 RX ADMIN — ESCITALOPRAM 10 MG: 10 TABLET, FILM COATED ORAL at 09:01

## 2018-01-22 RX ADMIN — ENOXAPARIN SODIUM 40 MG: 100 INJECTION SUBCUTANEOUS at 05:01

## 2018-01-22 RX ADMIN — MIDODRINE HYDROCHLORIDE 2.5 MG: 2.5 TABLET ORAL at 02:01

## 2018-01-22 RX ADMIN — DIVALPROEX SODIUM 500 MG: 500 TABLET, FILM COATED, EXTENDED RELEASE ORAL at 09:01

## 2018-01-22 RX ADMIN — GABAPENTIN 300 MG: 300 CAPSULE ORAL at 02:01

## 2018-01-22 RX ADMIN — GABAPENTIN 300 MG: 300 CAPSULE ORAL at 05:01

## 2018-01-22 NOTE — PLAN OF CARE
Cm met with pt at bedside for initial discharge planning assessment. Pt states he lives with his spouse, Emilie, [phone]. He has a cane, but spouse states he needs a rollator and glucometer.  Spouse also requests a urinal and a few pads when pt discharges. CM to follow for arrangements.     01/22/18 1300   Discharge Assessment   Assessment Type Discharge Planning Assessment   Confirmed/corrected address and phone number on facesheet? Yes   Assessment information obtained from? Patient   Expected Length of Stay (days) 3   Communicated expected length of stay with patient/caregiver yes   Prior to hospitilization cognitive status: Alert/Oriented   Prior to hospitalization functional status: Assistive Equipment   Current cognitive status: Alert/Oriented   Current Functional Status: Assistive Equipment   Lives With spouse   Able to Return to Prior Arrangements yes   Is patient able to care for self after discharge? Yes  (with family assist)   Who are your caregiver(s) and their phone number(s)? Emilie Richardsonlon, [phone]   Patient currently being followed by outpatient case management? No   Patient currently receives any other outside agency services? No   Equipment Currently Used at Home cane, straight   Do you have any problems affording any of your prescribed medications? No   Is the patient taking medications as prescribed? yes   Does the patient have transportation home? Yes   Transportation Available family or friend will provide   Does the patient receive services at [facility]? No   Discharge Plan A Home   Discharge Plan B Home   Patient/Family In Agreement With Plan yes

## 2018-01-22 NOTE — SUBJECTIVE & OBJECTIVE
Past Medical History:   Diagnosis Date    Anxiety     COPD (chronic obstructive pulmonary disease)     Diabetes mellitus     GERD (gastroesophageal reflux disease)     Hereditary acanthocytosis     Hypercholesterolemia     Hypertension     Iron deficiency anemia     Seizures     Stroke        Past Surgical History:   Procedure Laterality Date    CERVICAL FUSION      THYROIDECTOMY, PARTIAL Left     November 2017    TUMOR EXCISION      Fatty tumor excised from the right jaw       Review of patient's allergies indicates:   Allergen Reactions    Lisinopril        Current Neurological Medications: Depakote    No current facility-administered medications on file prior to encounter.      No current outpatient prescriptions on file prior to encounter.     Family History     None        Social History Main Topics    Smoking status: Former Smoker     Quit date: 1/21/2014    Smokeless tobacco: Never Used      Comment: Pt's wife reports pt is a recovering alcoholic.    Alcohol use No    Drug use: No    Sexual activity: Not on file     Review of Systems   Constitutional: Positive for activity change, appetite change and fatigue. Negative for fever.   HENT: Negative for congestion, ear pain and postnasal drip.    Eyes: Negative for discharge.   Respiratory: Negative for apnea, cough, shortness of breath and wheezing.    Cardiovascular: Negative for chest pain and leg swelling.   Gastrointestinal: Negative for abdominal distention, abdominal pain, diarrhea, nausea and vomiting.   Endocrine: Negative for polydipsia, polyphagia and polyuria.   Genitourinary: Negative for difficulty urinating, flank pain, frequency, hematuria and urgency.   Musculoskeletal: Positive for arthralgias, gait problem and myalgias. Negative for joint swelling.   Skin: Negative for pallor and rash.   Allergic/Immunologic: Negative for environmental allergies and food allergies.   Neurological: Positive for seizures and weakness. Negative  for dizziness, speech difficulty, light-headedness and headaches.   Hematological: Does not bruise/bleed easily.   Psychiatric/Behavioral: Negative for agitation.     Objective:     Vital Signs (Most Recent):  Temp: 97.3 °F (36.3 °C) (01/22/18 1618)  Pulse: 67 (01/22/18 1618)  Resp: 18 (01/22/18 1618)  BP: 124/78 (01/22/18 1618)  SpO2: 97 % (01/22/18 1618) Vital Signs (24h Range):  Temp:  [96.4 °F (35.8 °C)-98.4 °F (36.9 °C)] 97.3 °F (36.3 °C)  Pulse:  [63-84] 67  Resp:  [14-22] 18  SpO2:  [95 %-98 %] 97 %  BP: ()/(65-80) 124/78     Weight: 75.8 kg (167 lb 1.7 oz)  Body mass index is 25.41 kg/m².    Physical Exam   Constitutional: He appears well-developed and well-nourished.   HENT:   Head: Normocephalic and atraumatic.   Right Ear: Hearing normal.   Left Ear: Hearing normal.   Eyes:   Fundus examination shows sharp disc margins.  Pupils are equal and reactive with EOM being full without nystagmus   Neck: Normal range of motion. Neck supple.   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 He displays no atrophy (Most all 4 extremities well at bedside testing but has clumsiness of fine motor movements) and normal reflexes (DTRs generally depressed to absent distally). No cranial nerve deficit ( No facial asymmetry noted with facial movements and sensory exam being normal/symmetrical.  Corneals/gag reflexes normal.  Tongue & palate movements normal.  Hearing unimpaired.  Shoulder shrug was norm). Sensory deficit: Primary sensation symmetrical though diminished distally. He exhibits normal muscle tone. Coordination (clumsiness of fine motor movements bilaterally) and gait (gait not tested.  Spouse reported that he uses a walker for stability.) abnormal. He displays no Babinski's sign on the right side. He displays no Babinski's sign on the left side.   Mental status examination: Patient is oriented to place and person and  grossly to time.  He is able to give an adequate recent history though with loss of detail.  Immediate recall is normal.  Attention span and concentration was slightly impaired.   Judgment and insight is normal.  Language functions are intact with no evidence of aphasia or dysarthria.  Comprehension is unimpaired.  Affect is appropriate, mood was even.  No thought disorder is noted.   Vitals reviewed.           Significant Labs: All pertinent lab results from the past 24 hours have been reviewed.    Significant Imaging: I have reviewed all pertinent imaging results/findings within the past 24 hours.

## 2018-01-22 NOTE — HOSPITAL COURSE
Since admission to the floor patient has gradually improved and is back to his usual baseline according to his spouse.  His spouse was contacted over the phone for the history.  He has had no further seizures.  Blood sugar control has improved.

## 2018-01-22 NOTE — ASSESSMENT & PLAN NOTE
The patient has had a history of seizures for at least a year and has been followed at the [facility] neurology department.  He has been on Depakote the dosage of which was recently increased to 500 in the morning and 1000 at bedtime because of continued brief seizures.  The presence seizure that led to this admission might have been triggered by hypoglycemia as he had an initial blood sugar of 53.  He is now back to his usual baseline.    Recommendations: Continue present dosage of Depakote.  Will not change the Depakote dosage as it is below therapeutic and is present seizure may have been precipitated by the hypoglycemic episode.  He has an appointment to see his neurologist at [facility] on February 9, at which time his medication dosage will be reviewed.  Discussed diagnosis with patient's spouse.

## 2018-01-22 NOTE — ASSESSMENT & PLAN NOTE
Patient had multiple bouts of hypoglycemia in ER requiring intervention    BG Q4  D5NS with 20K ordered

## 2018-01-22 NOTE — CONSULTS
[facility]  Neurology  Consult Note    Patient Name: Campbell Costa  MRN: [id number]  Admission Date: 1/21/2018  Hospital Length of Stay: 0 days  Code Status: Full Code   Attending Provider: Brayan Aguila MD   Consulting Provider: Mike Landon MD  Primary Care Physician: Jim Croft MD  Principal Problem:Hypoglycemia    Consults   Subjective:     Chief Complaint:  seizure     HPI:   This is a 67-year-old -American male with a history of seizures for several months and has been evaluated at [facility] by Dr. Hernandez, neurology.  He had a workup done including brain scans and EEGs that documented seizures and was started on Depakote.  His spouse reported that the Depakote was recently increased to 500 mg in the morning and 1000 mg at bedtime because of recurrent seizures.  History prior to admission was reviewed with her and she reported that they found him passed out on the floor but he could say what happened as he he was very confused and disoriented after the episode.  She reports that this is his usual behavior after a seizure.  When seen at the emergency room here he was gradually improving.  A Depakote level done was 49.  In addition his blood sugar when he presented to the emergency room was only 53.  He does have history of long-standing diabetes.     Past Medical History:   Diagnosis Date    Anxiety     COPD (chronic obstructive pulmonary disease)     Diabetes mellitus     GERD (gastroesophageal reflux disease)     Hereditary acanthocytosis     Hypercholesterolemia     Hypertension     Iron deficiency anemia     Seizures     Stroke        Past Surgical History:   Procedure Laterality Date    CERVICAL FUSION      THYROIDECTOMY, PARTIAL Left     November 2017    TUMOR EXCISION      Fatty tumor excised from the right jaw       Review of patient's allergies indicates:   Allergen Reactions    Lisinopril        Current Neurological Medications: Depakote    No current  facility-administered medications on file prior to encounter.      No current outpatient prescriptions on file prior to encounter.     Family History     None        Social History Main Topics    Smoking status: Former Smoker     Quit date: 1/21/2014    Smokeless tobacco: Never Used      Comment: Pt's wife reports pt is a recovering alcoholic.    Alcohol use No    Drug use: No    Sexual activity: Not on file     Review of Systems   Constitutional: Positive for activity change, appetite change and fatigue. Negative for fever.   HENT: Negative for congestion, ear pain and postnasal drip.    Eyes: Negative for discharge.   Respiratory: Negative for apnea, cough, shortness of breath and wheezing.    Cardiovascular: Negative for chest pain and leg swelling.   Gastrointestinal: Negative for abdominal distention, abdominal pain, diarrhea, nausea and vomiting.   Endocrine: Negative for polydipsia, polyphagia and polyuria.   Genitourinary: Negative for difficulty urinating, flank pain, frequency, hematuria and urgency.   Musculoskeletal: Positive for arthralgias, gait problem and myalgias. Negative for joint swelling.   Skin: Negative for pallor and rash.   Allergic/Immunologic: Negative for environmental allergies and food allergies.   Neurological: Positive for seizures and weakness. Negative for dizziness, speech difficulty, light-headedness and headaches.   Hematological: Does not bruise/bleed easily.   Psychiatric/Behavioral: Negative for agitation.     Objective:     Vital Signs (Most Recent):  Temp: 97.3 °F (36.3 °C) (01/22/18 1618)  Pulse: 67 (01/22/18 1618)  Resp: 18 (01/22/18 1618)  BP: 124/78 (01/22/18 1618)  SpO2: 97 % (01/22/18 1618) Vital Signs (24h Range):  Temp:  [96.4 °F (35.8 °C)-98.4 °F (36.9 °C)] 97.3 °F (36.3 °C)  Pulse:  [63-84] 67  Resp:  [14-22] 18  SpO2:  [95 %-98 %] 97 %  BP: ()/(65-80) 124/78     Weight: 75.8 kg (167 lb 1.7 oz)  Body mass index is 25.41 kg/m².    Physical Exam    Constitutional: He appears well-developed and well-nourished.   HENT:   Head: Normocephalic and atraumatic.   Right Ear: Hearing normal.   Left Ear: Hearing normal.   Eyes:   Fundus examination shows sharp disc margins.  Pupils are equal and reactive with EOM being full without nystagmus   Neck: Normal range of motion. Neck supple.   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 He displays no atrophy (Most all 4 extremities well at bedside testing but has clumsiness of fine motor movements) and normal reflexes (DTRs generally depressed to absent distally). No cranial nerve deficit ( No facial asymmetry noted with facial movements and sensory exam being normal/symmetrical.  Corneals/gag reflexes normal.  Tongue & palate movements normal.  Hearing unimpaired.  Shoulder shrug was norm). Sensory deficit: Primary sensation symmetrical though diminished distally. He exhibits normal muscle tone. Coordination (clumsiness of fine motor movements bilaterally) and gait (gait not tested.  Spouse reported that he uses a walker for stability.) abnormal. He displays no Babinski's sign on the right side. He displays no Babinski's sign on the left side.   Mental status examination: Patient is oriented to place and person and grossly to time.  He is able to give an adequate recent history though with loss of detail.  Immediate recall is normal.  Attention span and concentration was slightly impaired.   Judgment and insight is normal.  Language functions are intact with no evidence of aphasia or dysarthria.  Comprehension is unimpaired.  Affect is appropriate, mood was even.  No thought disorder is noted.   Vitals reviewed.           Significant Labs: All pertinent lab results from the past 24 hours have been reviewed.    Significant Imaging: I have reviewed all pertinent imaging results/findings within the past 24 hours.    Assessment and Plan:      Seizure disorder    The patient has had a history of seizures for at least a year and has been followed at the [facility] neurology department.  He has been on Depakote the dosage of which was recently increased to 500 in the morning and 1000 at bedtime because of continued brief seizures.  The presence seizure that led to this admission might have been triggered by hypoglycemia as he had an initial blood sugar of 53.  He is now back to his usual baseline.    Recommendations: Continue present dosage of Depakote.  Will not change the Depakote dosage as it is below therapeutic and is present seizure may have been precipitated by the hypoglycemic episode.  He has an appointment to see his neurologist at [facility] on February 9, at which time his medication dosage will be reviewed.  Discussed diagnosis with patient's spouse.              VTE Risk Mitigation         Ordered     Medium Risk of VTE  Once      01/21/18 2115     enoxaparin injection 40 mg  Daily     Route:  Subcutaneous        01/21/18 2115     Place RICHA hose  Until discontinued      01/21/18 2115     Place sequential compression device  Until discontinued      01/21/18 2115          Thank you for your consult. I will sign off. Please contact us if you have any additional questions.    Mike Landon MD  Neurology  [facility]

## 2018-01-22 NOTE — ASSESSMENT & PLAN NOTE
Valproic level low (49). Could be non compliance. Concern for seizure being causative for being found down at home    Continue Depakote at home dose reassess tomorrow

## 2018-01-22 NOTE — ASSESSMENT & PLAN NOTE
H/H- 9/28.6.  Don't believe this is the reason for being found unresponsive at home    Continue home iron supplement

## 2018-01-22 NOTE — HPI
This is a 67 y.o. male with a history of COPD, Dm, HTN, seizures, and stroke who presents with complaint of fatigue that began today. Patient reports wife found him on the floor. Patient is unsure of how he got there. Wife reports patient was confused after the episode. While he was on the floor, wife reports patient had low blood pressure (75/48). Wife reports patient seems less responsive that usual. He denies any fever, chills, congestion, cough, chest pain, nausea, vomiting, or dysuria. Wife reports giving the patient morning medications and a muscle relaxer for back pain prior to the episode. Patient reports keppra was increased about three weeks ago.

## 2018-01-22 NOTE — UM SECONDARY REVIEW
NPT ORDER?  NEEDS AUTH FOR INPATIENT - WILL LEAVE MSG FOR GILBERTO IN ADMIT -ALSO LEVEL CARE ISSUE EMAILE TO EHR - ER/ADMIT TEAM CLAU RNCM

## 2018-01-22 NOTE — ED NOTES
Report received from Heather COSME RN. Pt sitting in bed with eyes open, respirations even and unlabored, Pt denies pain, and has no complaints at this time. Wife at bedside, rails up Xs 2, bed locked and low, call bell in reach, will continue to monitor.

## 2018-01-22 NOTE — HPI
This is a 67-year-old -American male with a history of seizures for several months and has been evaluated at [facility] by Dr. Hernandez, neurology.  He had a workup done including brain scans and EEGs that documented seizures and was started on Depakote.  His spouse reported that the Depakote was recently increased to 500 mg in the morning and 1000 mg at bedtime because of recurrent seizures.  History prior to admission was reviewed with her and she reported that they found him passed out on the floor but he could say what happened as he he was very confused and disoriented after the episode.  She reports that this is his usual behavior after a seizure.  When seen at the emergency room here he was gradually improving.  A Depakote level done was 49.  In addition his blood sugar when he presented to the emergency room was only 53.  He does have history of long-standing diabetes.

## 2018-01-22 NOTE — SUBJECTIVE & OBJECTIVE
Past Medical History:   Diagnosis Date    Anxiety     COPD (chronic obstructive pulmonary disease)     Diabetes mellitus     GERD (gastroesophageal reflux disease)     Hereditary acanthocytosis     Hypercholesterolemia     Hypertension     Iron deficiency anemia     Seizures     Stroke        Past Surgical History:   Procedure Laterality Date    CERVICAL FUSION      THYROIDECTOMY, PARTIAL Left     November 2017    TUMOR EXCISION      Fatty tumor excised from the right jaw       Review of patient's allergies indicates:   Allergen Reactions    Lisinopril        No current facility-administered medications on file prior to encounter.      No current outpatient prescriptions on file prior to encounter.     Family History     None        Social History Main Topics    Smoking status: Former Smoker     Quit date: 1/21/2014    Smokeless tobacco: Never Used      Comment: Pt's wife reports pt is a recovering alcoholic.    Alcohol use No    Drug use: No    Sexual activity: Not on file     Review of Systems   Constitutional: Positive for activity change, appetite change and fatigue. Negative for fever.   HENT: Negative for congestion, ear pain and postnasal drip.    Eyes: Negative for discharge.   Respiratory: Negative for apnea, cough, shortness of breath and wheezing.    Cardiovascular: Negative for chest pain and leg swelling.   Gastrointestinal: Negative for abdominal distention, abdominal pain, diarrhea, nausea and vomiting.   Endocrine: Negative for polydipsia, polyphagia and polyuria.   Genitourinary: Negative for difficulty urinating, flank pain, frequency, hematuria and urgency.   Musculoskeletal: Positive for arthralgias and myalgias. Negative for joint swelling.   Skin: Negative for pallor and rash.   Allergic/Immunologic: Negative for environmental allergies and food allergies.   Neurological: Positive for syncope and weakness. Negative for dizziness, speech difficulty, light-headedness and  headaches.   Hematological: Does not bruise/bleed easily.   Psychiatric/Behavioral: Negative for agitation.     Objective:     Vital Signs (Most Recent):  Temp: 98 °F (36.7 °C) (01/21/18 2110)  Pulse: 74 (01/21/18 2110)  Resp: 16 (01/21/18 2110)  BP: 111/65 (01/21/18 2110)  SpO2: 95 % (01/21/18 2110) Vital Signs (24h Range):  Temp:  [96.6 °F (35.9 °C)-98 °F (36.7 °C)] 98 °F (36.7 °C)  Pulse:  [60-84] 74  Resp:  [11-22] 16  SpO2:  [95 %-98 %] 95 %  BP: ()/(65-87) 111/65     Weight: 75.8 kg (167 lb 1.7 oz)  Body mass index is 25.41 kg/m².    Physical Exam   Constitutional: He is oriented to person, place, and time. He appears well-developed and well-nourished.   HENT:   Head: Normocephalic.   Eyes: Conjunctivae are normal.   Neck: Normal range of motion. Neck supple.   Cardiovascular: Normal rate, regular rhythm, normal heart sounds and intact distal pulses.    Pulses:       Radial pulses are 1+ on the right side, and 1+ on the left side.        Dorsalis pedis pulses are 1+ on the right side, and 1+ on the left side.   Pulmonary/Chest: Effort normal and breath sounds normal.   Abdominal: Soft. Bowel sounds are normal. He exhibits no distension. There is no tenderness.   Musculoskeletal: Normal range of motion.   Neurological: He is alert and oriented to person, place, and time. GCS eye subscore is 4. GCS verbal subscore is 5. GCS motor subscore is 6.   Skin: Skin is warm and dry.   Psychiatric: He has a normal mood and affect. His speech is normal and behavior is normal.           Significant Labs:   CBC:   Recent Labs  Lab 01/21/18  1417   WBC 5.76   HGB 9.0*   HCT 28.6*   *     CMP:   Recent Labs  Lab 01/21/18  1417      K 4.3      CO2 24   GLU 53*   BUN 15   CREATININE 0.8   CALCIUM 8.9   PROT 6.6   ALBUMIN 2.6*   BILITOT 0.1   ALKPHOS 67   AST 26   ALT 16   ANIONGAP 10   EGFRNONAA >60       Significant Imaging: I have reviewed all pertinent imaging results/findings within the past 24  hours.

## 2018-01-22 NOTE — ASSESSMENT & PLAN NOTE
CT- Redemonstration of scattered gas foci throughout the superficial and also deep venous structures of the imaged extracranial soft tissues, bilateral  spaces, bilateral cavernous sinuses and upper neck, anterior lower left neck, left subclavian vein and pulmonary trunk. Given the slight predominance on the left and also seen within the pulmonary trunk and left subclavian vein without identifiable cause such as sphenoid fracture or pneumomediastinum, this may be related to a current or recent indwelling venous catheter. Correlate clinically.    Recent thyroid surgery approximately 1 month ago  Will monitor

## 2018-01-22 NOTE — PLAN OF CARE
Problem: Patient Care Overview  Goal: Plan of Care Review  Outcome: Ongoing (interventions implemented as appropriate)  Patient remains free from injury or falls.  Vital signs stable throughout night on room air.  Positions self independently. BG monitoring/med admin per MAR. Voiding adequately through shift.  Frequent checks on pt performed at appropriate intervals, monitor/manage analgesia as needed throughout shift.  Bed in low locked position and call light within reach.  Will continue to monitor.

## 2018-01-22 NOTE — H&P
[facility] Hospital Medicine  History & Physical    Patient Name: Campbell Costa  MRN: [id number]  Admission Date: 1/21/2018  Attending Physician: Brayan Aguila MD   Primary Care Provider: Jim Croft MD         Patient information was obtained from patient, past medical records and ER records.     Subjective:     Principal Problem:Hypoglycemia    Chief Complaint:   Chief Complaint   Patient presents with    Fatigue     starting today, chronic back pain and dizziness w/ fall x3 in last 7 months        HPI: This is a 67 y.o. male with a history of COPD, Dm, HTN, seizures, and stroke who presents with complaint of fatigue that began today. Patient reports wife found him on the floor. Patient is unsure of how he got there. Wife reports patient was confused after the episode. While he was on the floor, wife reports patient had low blood pressure (75/48). Wife reports patient seems less responsive that usual. He denies any fever, chills, congestion, cough, chest pain, nausea, vomiting, or dysuria. Wife reports giving the patient morning medications and a muscle relaxer for back pain prior to the episode. Patient reports keppra was increased about three weeks ago.        Past Medical History:   Diagnosis Date    Anxiety     COPD (chronic obstructive pulmonary disease)     Diabetes mellitus     GERD (gastroesophageal reflux disease)     Hereditary acanthocytosis     Hypercholesterolemia     Hypertension     Iron deficiency anemia     Seizures     Stroke        Past Surgical History:   Procedure Laterality Date    CERVICAL FUSION      THYROIDECTOMY, PARTIAL Left     November 2017    TUMOR EXCISION      Fatty tumor excised from the right jaw       Review of patient's allergies indicates:   Allergen Reactions    Lisinopril        No current facility-administered medications on file prior to encounter.      No current outpatient prescriptions on file prior to encounter.     Family History      None        Social History Main Topics    Smoking status: Former Smoker     Quit date: 1/21/2014    Smokeless tobacco: Never Used      Comment: Pt's wife reports pt is a recovering alcoholic.    Alcohol use No    Drug use: No    Sexual activity: Not on file     Review of Systems   Constitutional: Positive for activity change, appetite change and fatigue. Negative for fever.   HENT: Negative for congestion, ear pain and postnasal drip.    Eyes: Negative for discharge.   Respiratory: Negative for apnea, cough, shortness of breath and wheezing.    Cardiovascular: Negative for chest pain and leg swelling.   Gastrointestinal: Negative for abdominal distention, abdominal pain, diarrhea, nausea and vomiting.   Endocrine: Negative for polydipsia, polyphagia and polyuria.   Genitourinary: Negative for difficulty urinating, flank pain, frequency, hematuria and urgency.   Musculoskeletal: Positive for arthralgias and myalgias. Negative for joint swelling.   Skin: Negative for pallor and rash.   Allergic/Immunologic: Negative for environmental allergies and food allergies.   Neurological: Positive for syncope and weakness. Negative for dizziness, speech difficulty, light-headedness and headaches.   Hematological: Does not bruise/bleed easily.   Psychiatric/Behavioral: Negative for agitation.     Objective:     Vital Signs (Most Recent):  Temp: 98 °F (36.7 °C) (01/21/18 2110)  Pulse: 74 (01/21/18 2110)  Resp: 16 (01/21/18 2110)  BP: 111/65 (01/21/18 2110)  SpO2: 95 % (01/21/18 2110) Vital Signs (24h Range):  Temp:  [96.6 °F (35.9 °C)-98 °F (36.7 °C)] 98 °F (36.7 °C)  Pulse:  [60-84] 74  Resp:  [11-22] 16  SpO2:  [95 %-98 %] 95 %  BP: ()/(65-87) 111/65     Weight: 75.8 kg (167 lb 1.7 oz)  Body mass index is 25.41 kg/m².    Physical Exam   Constitutional: He is oriented to person, place, and time. He appears well-developed and well-nourished.   HENT:   Head: Normocephalic.   Eyes: Conjunctivae are normal.   Neck:  Normal range of motion. Neck supple.   Cardiovascular: Normal rate, regular rhythm, normal heart sounds and intact distal pulses.    Pulses:       Radial pulses are 1+ on the right side, and 1+ on the left side.        Dorsalis pedis pulses are 1+ on the right side, and 1+ on the left side.   Pulmonary/Chest: Effort normal and breath sounds normal.   Abdominal: Soft. Bowel sounds are normal. He exhibits no distension. There is no tenderness.   Musculoskeletal: Normal range of motion.   Neurological: He is alert and oriented to person, place, and time. GCS eye subscore is 4. GCS verbal subscore is 5. GCS motor subscore is 6.   Skin: Skin is warm and dry.   Psychiatric: He has a normal mood and affect. His speech is normal and behavior is normal.           Significant Labs:   CBC:   Recent Labs  Lab 01/21/18  1417   WBC 5.76   HGB 9.0*   HCT 28.6*   *     CMP:   Recent Labs  Lab 01/21/18  1417      K 4.3      CO2 24   GLU 53*   BUN 15   CREATININE 0.8   CALCIUM 8.9   PROT 6.6   ALBUMIN 2.6*   BILITOT 0.1   ALKPHOS 67   AST 26   ALT 16   ANIONGAP 10   EGFRNONAA >60       Significant Imaging: I have reviewed all pertinent imaging results/findings within the past 24 hours.    Assessment/Plan:     * Hypoglycemia    Patient had multiple bouts of hypoglycemia in ER requiring intervention    BG Q4  D5NS with 20K ordered          Iron deficiency anemia    H/H- 9/28.6.  Don't believe this is the reason for being found unresponsive at home    Continue home iron supplement          Subcutaneous air, initial encounter    CT- Redemonstration of scattered gas foci throughout the superficial and also deep venous structures of the imaged extracranial soft tissues, bilateral  spaces, bilateral cavernous sinuses and upper neck, anterior lower left neck, left subclavian vein and pulmonary trunk. Given the slight predominance on the left and also seen within the pulmonary trunk and left subclavian vein  without identifiable cause such as sphenoid fracture or pneumomediastinum, this may be related to a current or recent indwelling venous catheter. Correlate clinically.    Recent thyroid surgery approximately 1 month ago  Will monitor          Type 2 diabetes mellitus    A1c pending    BG Q4  On Metformin at home. Will hold and provide SSI        Hyperlipidemia    Lipid Panel pending  Continue Crestor          Hypotension    Normotensive currently. Wife reported hypotension at home    Midodrine continued          Seizure disorder    Valproic level low (49). Could be non compliance. Concern for seizure being causative for being found down at home    Continue Depakote at home dose reassess tomorrow            VTE Risk Mitigation         Ordered     Medium Risk of VTE  Once      01/21/18 2115     enoxaparin injection 40 mg  Daily     Route:  Subcutaneous        01/21/18 2115     Place RICHA hose  Until discontinued      01/21/18 2115     Place sequential compression device  Until discontinued      01/21/18 2115             Power Morley NP  Department of Hospital Medicine   [facility]

## 2018-01-23 PROBLEM — E83.42 HYPOMAGNESEMIA: Status: ACTIVE | Noted: 2018-01-23

## 2018-01-23 LAB
ALBUMIN SERPL BCP-MCNC: 2.2 G/DL
ALP SERPL-CCNC: 60 U/L
ALT SERPL W/O P-5'-P-CCNC: 18 U/L
ANION GAP SERPL CALC-SCNC: 5 MMOL/L
ANISOCYTOSIS BLD QL SMEAR: SLIGHT
AST SERPL-CCNC: 22 U/L
BASOPHILS # BLD AUTO: 0.01 K/UL
BASOPHILS NFR BLD: 0.2 %
BILIRUB SERPL-MCNC: 0.1 MG/DL
BUN SERPL-MCNC: 9 MG/DL
BURR CELLS BLD QL SMEAR: ABNORMAL
CALCIUM SERPL-MCNC: 8.4 MG/DL
CHLORIDE SERPL-SCNC: 109 MMOL/L
CO2 SERPL-SCNC: 28 MMOL/L
CREAT SERPL-MCNC: 0.7 MG/DL
DIFFERENTIAL METHOD: ABNORMAL
EOSINOPHIL # BLD AUTO: 0.2 K/UL
EOSINOPHIL NFR BLD: 3.6 %
ERYTHROCYTE [DISTWIDTH] IN BLOOD BY AUTOMATED COUNT: 18.8 %
EST. GFR  (AFRICAN AMERICAN): >60 ML/MIN/1.73 M^2
EST. GFR  (NON AFRICAN AMERICAN): >60 ML/MIN/1.73 M^2
GLUCOSE SERPL-MCNC: 121 MG/DL
HCT VFR BLD AUTO: 27.5 %
HGB BLD-MCNC: 8.7 G/DL
HYPOCHROMIA BLD QL SMEAR: ABNORMAL
LYMPHOCYTES # BLD AUTO: 2.9 K/UL
LYMPHOCYTES NFR BLD: 55.3 %
MAGNESIUM SERPL-MCNC: 1.5 MG/DL
MCH RBC QN AUTO: 23.8 PG
MCHC RBC AUTO-ENTMCNC: 31.6 G/DL
MCV RBC AUTO: 75 FL
MONOCYTES # BLD AUTO: 0.5 K/UL
MONOCYTES NFR BLD: 9.9 %
NEUTROPHILS # BLD AUTO: 1.6 K/UL
NEUTROPHILS NFR BLD: 31 %
OVALOCYTES BLD QL SMEAR: ABNORMAL
PHOSPHATE SERPL-MCNC: 2.5 MG/DL
PLATELET # BLD AUTO: 133 K/UL
PLATELET BLD QL SMEAR: ABNORMAL
PMV BLD AUTO: 10.7 FL
POCT GLUCOSE: 110 MG/DL (ref 70–110)
POCT GLUCOSE: 113 MG/DL (ref 70–110)
POCT GLUCOSE: 115 MG/DL (ref 70–110)
POIKILOCYTOSIS BLD QL SMEAR: ABNORMAL
POLYCHROMASIA BLD QL SMEAR: ABNORMAL
POTASSIUM SERPL-SCNC: 4.6 MMOL/L
PROT SERPL-MCNC: 5.9 G/DL
RBC # BLD AUTO: 3.66 M/UL
SCHISTOCYTES BLD QL SMEAR: ABNORMAL
SODIUM SERPL-SCNC: 142 MMOL/L
WBC # BLD AUTO: 5.24 K/UL

## 2018-01-23 PROCEDURE — 80053 COMPREHEN METABOLIC PANEL: CPT

## 2018-01-23 PROCEDURE — 25000003 PHARM REV CODE 250: Performed by: NURSE PRACTITIONER

## 2018-01-23 PROCEDURE — 25000003 PHARM REV CODE 250: Performed by: HOSPITALIST

## 2018-01-23 PROCEDURE — 63600175 PHARM REV CODE 636 W HCPCS: Performed by: HOSPITALIST

## 2018-01-23 PROCEDURE — 84100 ASSAY OF PHOSPHORUS: CPT

## 2018-01-23 PROCEDURE — 11000001 HC ACUTE MED/SURG PRIVATE ROOM

## 2018-01-23 PROCEDURE — 97165 OT EVAL LOW COMPLEX 30 MIN: CPT

## 2018-01-23 PROCEDURE — 85025 COMPLETE CBC W/AUTO DIFF WBC: CPT

## 2018-01-23 PROCEDURE — 63600175 PHARM REV CODE 636 W HCPCS: Performed by: NURSE PRACTITIONER

## 2018-01-23 PROCEDURE — 97161 PT EVAL LOW COMPLEX 20 MIN: CPT

## 2018-01-23 PROCEDURE — 97116 GAIT TRAINING THERAPY: CPT

## 2018-01-23 PROCEDURE — 97530 THERAPEUTIC ACTIVITIES: CPT

## 2018-01-23 PROCEDURE — 99232 SBSQ HOSP IP/OBS MODERATE 35: CPT | Mod: ,,, | Performed by: HOSPITALIST

## 2018-01-23 PROCEDURE — 83735 ASSAY OF MAGNESIUM: CPT

## 2018-01-23 PROCEDURE — 36415 COLL VENOUS BLD VENIPUNCTURE: CPT

## 2018-01-23 RX ORDER — MAGNESIUM SULFATE HEPTAHYDRATE 40 MG/ML
2 INJECTION, SOLUTION INTRAVENOUS ONCE
Status: COMPLETED | OUTPATIENT
Start: 2018-01-23 | End: 2018-01-23

## 2018-01-23 RX ORDER — INSULIN ASPART 100 [IU]/ML
0-5 INJECTION, SOLUTION INTRAVENOUS; SUBCUTANEOUS
Status: DISCONTINUED | OUTPATIENT
Start: 2018-01-23 | End: 2018-01-24 | Stop reason: HOSPADM

## 2018-01-23 RX ADMIN — GABAPENTIN 300 MG: 300 CAPSULE ORAL at 05:01

## 2018-01-23 RX ADMIN — MAGNESIUM SULFATE IN WATER 2 G: 40 INJECTION, SOLUTION INTRAVENOUS at 11:01

## 2018-01-23 RX ADMIN — FERROUS SULFATE TAB EC 325 MG (65 MG FE EQUIVALENT) 325 MG: 325 (65 FE) TABLET DELAYED RESPONSE at 09:01

## 2018-01-23 RX ADMIN — ESCITALOPRAM 10 MG: 10 TABLET, FILM COATED ORAL at 09:01

## 2018-01-23 RX ADMIN — STANDARDIZED SENNA CONCENTRATE AND DOCUSATE SODIUM 1 TABLET: 8.6; 5 TABLET, FILM COATED ORAL at 10:01

## 2018-01-23 RX ADMIN — ENOXAPARIN SODIUM 40 MG: 100 INJECTION SUBCUTANEOUS at 10:01

## 2018-01-23 RX ADMIN — PANTOPRAZOLE SODIUM 40 MG: 40 TABLET, DELAYED RELEASE ORAL at 09:01

## 2018-01-23 RX ADMIN — MIDODRINE HYDROCHLORIDE 2.5 MG: 2.5 TABLET ORAL at 05:01

## 2018-01-23 RX ADMIN — STANDARDIZED SENNA CONCENTRATE AND DOCUSATE SODIUM 1 TABLET: 8.6; 5 TABLET, FILM COATED ORAL at 09:01

## 2018-01-23 RX ADMIN — MIDODRINE HYDROCHLORIDE 2.5 MG: 2.5 TABLET ORAL at 10:01

## 2018-01-23 RX ADMIN — DIVALPROEX SODIUM 500 MG: 500 TABLET, FILM COATED, EXTENDED RELEASE ORAL at 09:01

## 2018-01-23 RX ADMIN — ROSUVASTATIN CALCIUM 10 MG: 10 TABLET, FILM COATED ORAL at 10:01

## 2018-01-23 RX ADMIN — GABAPENTIN 300 MG: 300 CAPSULE ORAL at 01:01

## 2018-01-23 RX ADMIN — ASPIRIN 81 MG: 81 TABLET, COATED ORAL at 09:01

## 2018-01-23 RX ADMIN — GABAPENTIN 300 MG: 300 CAPSULE ORAL at 10:01

## 2018-01-23 RX ADMIN — POTASSIUM CHLORIDE, DEXTROSE MONOHYDRATE AND SODIUM CHLORIDE: 150; 5; 900 INJECTION, SOLUTION INTRAVENOUS at 01:01

## 2018-01-23 RX ADMIN — TAMSULOSIN HYDROCHLORIDE 0.4 MG: 0.4 CAPSULE ORAL at 09:01

## 2018-01-23 RX ADMIN — POTASSIUM CHLORIDE, DEXTROSE MONOHYDRATE AND SODIUM CHLORIDE: 150; 5; 900 INJECTION, SOLUTION INTRAVENOUS at 02:01

## 2018-01-23 RX ADMIN — DIVALPROEX SODIUM 1000 MG: 500 TABLET, FILM COATED, EXTENDED RELEASE ORAL at 10:01

## 2018-01-23 NOTE — ASSESSMENT & PLAN NOTE
- Valproic level low (49). Could be non compliance. Concern for seizure being causative for being found down at home  - Continue Depakote at home dose   - Neurology noted:  Continue present dosage of Depakote.  Will not change the Depakote dosage as it is below therapeutic and is present seizure may have been precipitated by the hypoglycemic episode.  He has an appointment to see his neurologist at [facility] on February 9, at which time his medication dosage will be reviewed.  Discussed diagnosis with patient's spouse.

## 2018-01-23 NOTE — NURSING
No significant events overnight. Remains free from fall, injury, and skin breakdown. Voiding via urinal located at the bedside. VSS on RA throughout the night. Denies pain. TEDs/SCDs in place. Plan of care reviewed with patient and all questions answered. Bed low, locked w/ bed alarm on. Call light within reach. Purposeful rounding performed. Resting comfortably in bed, family at the bedside, no other complaints at this time.

## 2018-01-23 NOTE — PT/OT/SLP EVAL
Physical Therapy Evaluation    Patient Name:  Campbell Costa   MRN:  [id number]    Recommendations:     Discharge Recommendations:  home with home health, home health PT, home health OT   Discharge Equipment Recommendations: rollator   Barriers to discharge: None    Assessment:     Campbell Costa is a 67 y.o. male admitted with a medical diagnosis of Hypoglycemia.  He presents with the following impairments/functional limitations:  weakness, impaired endurance, impaired functional mobilty, gait instability, impaired balance, impaired self care skills, decreased safety awareness .  Pt with decreased balance using SPC-not safe with it.  History of multiple falls-so high fall risk.  .    Rehab Prognosis:  good; patient would benefit from acute skilled PT services to address these deficits and reach maximum level of function.      Recent Surgery: * No surgery found *      Plan:     During this hospitalization, patient to be seen 6 x/week to address the above listed problems via gait training, therapeutic activities, therapeutic exercises  · Plan of Care Expires:  02/22/18   Plan of Care Reviewed with: patient    Subjective     Communicated with nursing prior to session.  Patient found in bed  upon PT entry to room, agreeable to evaluation.  Bed alarm on    Chief Complaint: needs to go to the bathroom  Patient comments/goals: states he has fallen at home several times in the past.  I need to get one of those(rollator).    Pain/Comfort:  · Pain Rating 1: 0/10  · Pain Rating Post-Intervention 1: 0/10    Patients cultural, spiritual, Roman Catholic conflicts given the current situation: none stated    Living Environment:  Pt lives with wife in 1 story house with 1 step to porch and then 2 into house with L HR.  Has tub shower combo.  Handicap toilet  Prior to admission, patients level of function was mod I with SPC but reports has been using his wife's rollator recently.  Able to  Get on and off handicap toilet(had low toilet that  was replaced).  Pt doesn't drive, wife doesn't drive but will get rides to grocery, does cooking.  Patient has the following equipment: cane, straight (pt states his wife has rollator that he was using right before he came into hospital).  DME owned (not currently used): none.  Upon discharge, patient will have assistance from wife  .    Objective:     Patient found with: bed alarm, peripheral IV     General Precautions: Standard, fall, hearing impaired, seizure   Orthopedic Precautions:N/A   Braces: N/A     Exams:  · Cognitive Exam:  Patient is oriented to Person, Place(Mormon)  and Time-delay in answering,  follows one step commands   · Gross Motor Coordination:  WFL  · Postural Exam:  Patient presented with the following abnormalities:    · -       Rounded shoulders  · -       Forward head  · Sensation:    · -       Intact  light/touch BLE  · Skin Integrity/Edema:      · -       Skin integrity: Visible skin intact   · UE ROM and strength-see OT eval  · RLE ROM: WFL  · RLE Strength: WFL  · LLE ROM: WFL  · LLE Strength: WFL    Functional Mobility:  · Bed Mobility:     · Supine to Sit: supervision  · Transfers:     · Sit to Stand:  stand by assistance with rollator  · Gait: pt amb 10' to bathroom and then 100' with rollator CGA/SBA. amb 10' with SPC and min to CGA.   decreased farooq, step and stride length  · Balance: fair with dynamic standing  balance with AD /poor without    AM-PAC 6 CLICK MOBILITY  Total Score:18       Therapeutic Activities and Exercises:   PT eval.  assist to bathroom and then amb with SPC and rollator.  Pt to bs chair and set up for lunch.  Instructed pt not to get up without assist-pt verbalizes understanding    Patient left up in chair with all lines intact, call button in reach and nurse notified.    GOALS:    Physical Therapy Goals        Problem: Physical Therapy Goal    Goal Priority Disciplines Outcome Goal Variances Interventions   Physical Therapy Goal     PT/OT, PT Ongoing  (interventions implemented as appropriate)     Description:  Goals to be met by: 18     Patient will increase functional independence with mobility by performin. Supine to sit with S  2. Sit to supine with S  3. Sit to stand transfer with S  4. Bed to chair transfer with S   5. Gait  x 150 feet with Supervision using rollator.   6. Ascend/descend 2 stair with left Handrails Minimal Assistance   7. Ascend/Descend 4 inch curb step with Minimal Assistance using rollator.                      History:     Past Medical History:   Diagnosis Date    Anxiety     COPD (chronic obstructive pulmonary disease)     Diabetes mellitus     GERD (gastroesophageal reflux disease)     Hereditary acanthocytosis     Hypercholesterolemia     Hypertension     Iron deficiency anemia     Seizures     Stroke        Past Surgical History:   Procedure Laterality Date    CERVICAL FUSION      THYROIDECTOMY, PARTIAL Left     2017    TUMOR EXCISION      Fatty tumor excised from the right jaw       Clinical Decision Making:     History  Co-morbidities and personal factors that may impact the plan of care Examination  Body Structures and Functions, activity limitations and participation restrictions that may impact the plan of care Clinical Presentation   Decision Making/ Complexity Score   Co-morbidities:   [] Time since onset of injury / illness / exacerbation  [] Status of current condition  []Patient's cognitive status and safety concerns    [] Multiple Medical Problems (see med hx)  Personal Factors:   [] Patient's age  [] Prior Level of function   [] Patient's home situation (environment and family support)  [] Patient's level of motivation  [] Expected progression of patient      HISTORY:(criteria)    [] 97491 - no personal factors/history    [] 68183 - has 1-2 personal factor/comorbidity     [] 80106 - has >3 personal factor/comorbidity     Body Regions:  [] Objective examination findings  [] Head     []   Neck  [] Trunk   [] Upper Extremity  [] Lower Extremity    Body Systems:  [] For communication ability, affect, cognition, language, and learning style: the assessment of the ability to make needs known, consciousness, orientation (person, place, and time), expected emotional /behavioral responses, and learning preferences (eg, learning barriers, education  needs)  [] For the neuromuscular system: a general assessment of gross coordinated movement (eg, balance, gait, locomotion, transfers, and transitions) and motor function  (motor control and motor learning)  [] For the musculoskeletal system: the assessment of gross symmetry, gross range of motion, gross strength, height, and weight  [] For the integumentary system: the assessment of pliability(texture), presence of scar formation, skin color, and skin integrity  [] For cardiovascular/pulmonary system: the assessment of heart rate, respiratory rate, blood pressure, and edema     Activity limitations:    [] Patient's cognitive status and saf ety concerns          [] Status of current condition      [] Weight bearing restriction  [] Cardiopulmunary Restriction    Participation Restrictions:   [] Goals and goal agreement with the patient     [] Rehab potential (prognosis) and probable outcome      Examination of Body System: (criteria)    [] 80779 - addressing 1-2 elements    [] 01239 - addressing a total of 3 or more elements     [] 32985 -  Addressing a total of 4 or more elements         Clinical Presentation: (criteria)  Choose one     On examination of body system using standardized tests and measures patient presents with (CHOOSE ONE) elements from any of the following: body structures and functions, activity limitations, and/or participation restrictions.  Leading to a clinical presentation that is considered (CHOOSE ONE)                              Clinical Decision Making  (Eval Complexity):  Choose One     Time Tracking:     PT Received On: 01/23/18  PT  Start Time: 1118     PT Stop Time: 1200  PT Total Time (min): 42 min     Billable Minutes: Evaluation 15, Gait Training 14 and Therapeutic Activity 13      Jerica Peterson, PT  01/23/2018

## 2018-01-23 NOTE — ASSESSMENT & PLAN NOTE
- Patient had multiple bouts of hypoglycemia in ER requiring intervention  - BG Q4  - D5NS with 20K ordered, and well try weaning to 75/hr and wean off as tolerated.   - Monitor.

## 2018-01-23 NOTE — PROGRESS NOTES
[facility] Hospital Medicine  Progress Note    Patient Name: Campbell Costa  MRN: [id number]  Patient Class: IP- Inpatient   Admission Date: 1/21/2018  Length of Stay: 1 days  Attending Physician: Ernesto Severino MD  Primary Care Provider: Jim Croft MD        Subjective:     Principal Problem:Hypoglycemia    HPI:  This is a 67 y.o. male with a history of COPD, Dm, HTN, seizures, and stroke who presents with complaint of fatigue that began today. Patient reports wife found him on the floor. Patient is unsure of how he got there. Wife reports patient was confused after the episode. While he was on the floor, wife reports patient had low blood pressure (75/48). Wife reports patient seems less responsive that usual. He denies any fever, chills, congestion, cough, chest pain, nausea, vomiting, or dysuria. Wife reports giving the patient morning medications and a muscle relaxer for back pain prior to the episode. Patient reports keppra was increased about three weeks ago.        Hospital Course:  No notes on file    Interval History:   Pt new to me.  Doing okay.      Review of Systems   Constitutional: Negative for appetite change and fever.   Respiratory: Negative for cough and shortness of breath.    Cardiovascular: Negative for chest pain and palpitations.   Gastrointestinal: Negative for nausea and vomiting.   Skin: Negative for color change and rash.   Neurological: Negative for dizziness and weakness.   Psychiatric/Behavioral: Negative for agitation and confusion.     Objective:     Vital Signs (Most Recent):  Temp: 97.6 °F (36.4 °C) (01/23/18 0746)  Pulse: 68 (01/23/18 0746)  Resp: 18 (01/23/18 0746)  BP: 139/83 (01/23/18 0746)  SpO2: 97 % (01/23/18 0746) Vital Signs (24h Range):  Temp:  [97.3 °F (36.3 °C)-98.4 °F (36.9 °C)] 97.6 °F (36.4 °C)  Pulse:  [67-73] 68  Resp:  [18] 18  SpO2:  [95 %-97 %] 97 %  BP: (113-146)/(65-83) 139/83     Weight: 75.8 kg (167 lb 1.7 oz)  Body mass index is 25.41  kg/m².    Intake/Output Summary (Last 24 hours) at 01/23/18 1127  Last data filed at 01/23/18 0432   Gross per 24 hour   Intake          2816.67 ml   Output             1700 ml   Net          1116.67 ml      Physical Exam   Constitutional: He is oriented to person, place, and time. He appears well-developed and well-nourished.   HENT:   Head: Normocephalic and atraumatic.   Eyes: Conjunctivae are normal. Pupils are equal, round, and reactive to light.   Neck: Normal range of motion. Neck supple.   Cardiovascular: Normal rate and regular rhythm.    Pulmonary/Chest: Effort normal and breath sounds normal.   Abdominal: Soft.   Musculoskeletal:   Fingers missing right hand   Neurological: He is alert and oriented to person, place, and time.   Skin: Skin is warm and dry.       Significant Labs:   BMP:   Recent Labs  Lab 01/23/18  0421   *      K 4.6      CO2 28   BUN 9   CREATININE 0.7   CALCIUM 8.4*   MG 1.5*     CBC:   Recent Labs  Lab 01/21/18  1417 01/22/18  0432 01/23/18  0421   WBC 5.76 4.87 5.24   HGB 9.0* 9.1* 8.7*   HCT 28.6* 28.5* 27.5*   * 156 133*       Significant Imaging: I have reviewed all pertinent imaging results/findings within the past 24 hours.   Imaging Results          CT Chest Without Contrast (Final result)  Result time 01/21/18 16:35:49    Final result by Christopher Rapp MD (01/21/18 16:35:49)                 Impression:        1. Redemonstration of scattered gas foci throughout the superficial and also deep venous structures of the imaged extracranial soft tissues, bilateral  spaces, bilateral cavernous sinuses and upper neck, anterior lower left neck, left subclavian vein and pulmonary trunk. Given the slight predominance on the left and also seen within the pulmonary trunk and left subclavian vein without identifiable cause such as sphenoid fracture or pneumomediastinum, this may be related to a current or recent indwelling venous catheter. Correlate  clinically.    2. Otherwise, no acute neck soft tissue or chest abnormality allowing for lack of intravenous contrast.    3. Absent left thyroid lobe which may be related to prior surgery. Right thyroid subcentimeter low attenuation focus which may represent a nodule. Further evaluation with elective/nonemergent thyroid ultrasound can be obtained as warranted.    4. Additional findings as above.      Electronically signed by: JULIA REED MD, MD  Date:     01/21/18  Time:    16:35              Narrative:    Technique:  Axial images were obtained through the cervical soft tissues and chest without intravenous contrast. Coronal and sagittal reformatted images were generated.    Comparison: Head CT and chest radiograph earlier same day    Findings: Please note that the lack of intravenous contrast limits evaluation of soft tissue and vascular structures.    CT soft tissue neck:  Once again, scattered gas foci throughout the superficial and also deep presumed venous structures of the imaged extracranial soft tissues, bilateral  spaces, bilateral cavernous sinuses and upper neck. Slightly more predominant on the left.    There is mucosal thickening within the left maxillary sinus.  No air fluid levels within the sinuses.  The remaining visualized paranasal sinuses and mastoid air cells are clear.    The orbits and orbital contents appear within normal limits.  The globes are symmetric and the ocular lenses are anteriorly located.  No acute fracture or dislocation.  There is mild leftward deviation of the nasal septum .  Bilateral mandibular condyles and TMJs appear relatively symmetric and intact.  The maxilla is edentulous. Several dental caries noted of the remaining anterior mandibular teeth.    The base of the tongue appears within normal limits.  No peritonsillar, parapharyngeal or retropharyngeal discrete mass or fluid collections identified, allowing for lack of intravenous contrast.  The airway is  relatively midline and overall patent.  Epiglottis and uvula appear normal. The bilateral valleculae and piriform sinuses appear within normal limits.  The aryepiglottic folds are within normal limits.  The true and false vocal cords are grossly within normal limits.  No prevertebral soft tissue swelling or paraspinal hematoma.    The bilateral parotid, submandibular, and sublingual glands are normal in appearance.  There are scattered normal sized lymph nodes noted throughout the cervical region and submental and submandibular regions, but not enlarged by CT criteria.   No subcutaneous emphysema or radiodense foreign body.    Surgical changes of ACDF at C3-C6 levels without evidence of hardware failure or loosening. Multilevel mild to moderate degenerative change of the cervical spine. No acute displaced fracture or dislocation. Bilateral inferior frontal lobe and right parieto-occipital watershed zone encephalomalacia partially imaged. No acute findings seen within the included intracranial contents.      CT chest: A few gas foci seen within anterior lower left neck and left subclavian presumed venous structures as well as anterior aspect of the pulmonary trunk.    Left thyroid lobe is not identified and likely surgically absent. Subcentimeter low-attenuation parenchymal focus within the anterior right thyroid lobe.    Extrathoracic soft tissues are within normal limits.    There is a left-sided arch. Minimal scattered atherosclerosis. The aorta is non-aneurysmal. Heart is normal in size without significant pericardial fluid. Mild coronary atherosclerosis. Minimal aortic valvular calcification. Esophagus is normal in course and caliber. No mediastinal, hilar or axillary lymphadenopathy. Pulmonary trunk is upper limits of normal in caliber. No pneumomediastinum.    The lungs demonstrate scattered areas of platelike scarring versus atelectasis primarily within the right lung. Mild dependent atelectasis, right or  than left. There is mild architectural distortion with reticulation seen along the posterior lateral aspect of the right lung. No large consolidation, pleural effusion or pneumothorax. No suspicious pulmonary nodules. There is bilateral mild bronchiectasis, slightly more prominent on the right. Mild subpleural cystic change noted bilaterally.    Included upper abdomen is within normal limits. No acute displaced fracture, dislocation or destructive osseous process. Age-related degenerative change.                             CT Soft Tissue Neck WO Contrast (Final result)  Result time 01/21/18 16:35:49    Final result by Julia Rapp MD (01/21/18 16:35:49)                 Impression:        1. Redemonstration of scattered gas foci throughout the superficial and also deep venous structures of the imaged extracranial soft tissues, bilateral  spaces, bilateral cavernous sinuses and upper neck, anterior lower left neck, left subclavian vein and pulmonary trunk. Given the slight predominance on the left and also seen within the pulmonary trunk and left subclavian vein without identifiable cause such as sphenoid fracture or pneumomediastinum, this may be related to a current or recent indwelling venous catheter. Correlate clinically.    2. Otherwise, no acute neck soft tissue or chest abnormality allowing for lack of intravenous contrast.    3. Absent left thyroid lobe which may be related to prior surgery. Right thyroid subcentimeter low attenuation focus which may represent a nodule. Further evaluation with elective/nonemergent thyroid ultrasound can be obtained as warranted.    4. Additional findings as above.      Electronically signed by: JULIA RAPP MD, MD  Date:     01/21/18  Time:    16:35              Narrative:    Technique:  Axial images were obtained through the cervical soft tissues and chest without intravenous contrast. Coronal and sagittal reformatted images were generated.    Comparison: Head  CT and chest radiograph earlier same day    Findings: Please note that the lack of intravenous contrast limits evaluation of soft tissue and vascular structures.    CT soft tissue neck:  Once again, scattered gas foci throughout the superficial and also deep presumed venous structures of the imaged extracranial soft tissues, bilateral  spaces, bilateral cavernous sinuses and upper neck. Slightly more predominant on the left.    There is mucosal thickening within the left maxillary sinus.  No air fluid levels within the sinuses.  The remaining visualized paranasal sinuses and mastoid air cells are clear.    The orbits and orbital contents appear within normal limits.  The globes are symmetric and the ocular lenses are anteriorly located.  No acute fracture or dislocation.  There is mild leftward deviation of the nasal septum .  Bilateral mandibular condyles and TMJs appear relatively symmetric and intact.  The maxilla is edentulous. Several dental caries noted of the remaining anterior mandibular teeth.    The base of the tongue appears within normal limits.  No peritonsillar, parapharyngeal or retropharyngeal discrete mass or fluid collections identified, allowing for lack of intravenous contrast.  The airway is relatively midline and overall patent.  Epiglottis and uvula appear normal. The bilateral valleculae and piriform sinuses appear within normal limits.  The aryepiglottic folds are within normal limits.  The true and false vocal cords are grossly within normal limits.  No prevertebral soft tissue swelling or paraspinal hematoma.    The bilateral parotid, submandibular, and sublingual glands are normal in appearance.  There are scattered normal sized lymph nodes noted throughout the cervical region and submental and submandibular regions, but not enlarged by CT criteria.   No subcutaneous emphysema or radiodense foreign body.    Surgical changes of ACDF at C3-C6 levels without evidence of hardware  failure or loosening. Multilevel mild to moderate degenerative change of the cervical spine. No acute displaced fracture or dislocation. Bilateral inferior frontal lobe and right parieto-occipital watershed zone encephalomalacia partially imaged. No acute findings seen within the included intracranial contents.      CT chest: A few gas foci seen within anterior lower left neck and left subclavian presumed venous structures as well as anterior aspect of the pulmonary trunk.    Left thyroid lobe is not identified and likely surgically absent. Subcentimeter low-attenuation parenchymal focus within the anterior right thyroid lobe.    Extrathoracic soft tissues are within normal limits.    There is a left-sided arch. Minimal scattered atherosclerosis. The aorta is non-aneurysmal. Heart is normal in size without significant pericardial fluid. Mild coronary atherosclerosis. Minimal aortic valvular calcification. Esophagus is normal in course and caliber. No mediastinal, hilar or axillary lymphadenopathy. Pulmonary trunk is upper limits of normal in caliber. No pneumomediastinum.    The lungs demonstrate scattered areas of platelike scarring versus atelectasis primarily within the right lung. Mild dependent atelectasis, right or than left. There is mild architectural distortion with reticulation seen along the posterior lateral aspect of the right lung. No large consolidation, pleural effusion or pneumothorax. No suspicious pulmonary nodules. There is bilateral mild bronchiectasis, slightly more prominent on the right. Mild subpleural cystic change noted bilaterally.    Included upper abdomen is within normal limits. No acute displaced fracture, dislocation or destructive osseous process. Age-related degenerative change.                             X-Ray Chest AP Portable (Final result)  Result time 01/21/18 14:41:45    Final result by Shen Harley MD (01/21/18 14:41:45)                 Impression:     No acute  cardiopulmonary process.      Electronically signed by: MARSHALL CARRERO MD  Date:     01/21/18  Time:    14:41              Narrative:    Chest x-ray, 1 view    Comparison: None.    Findings: ACDF hardware noted. There is no consolidation, effusion, or pneumothorax.  Cardiomediastinal silhouette is unremarkable.                             CT Head Without Contrast (Final result)  Result time 01/21/18 14:47:37    Final result by Julia Rapp MD (01/21/18 14:47:37)                 Impression:        1.  No acute intracranial findings identified. Specifically, no intracranial hemorrhage.    2.  Bilateral frontal lobe and right parieto-occipital watershed zone encephalomalacia suggesting sequela of remote infarct and/or trauma.    3. Senescent changes as above.    4.  Diffuse gas foci throughout the presumed venous system of the extracranial soft tissues and also bilateral cavernous sinuses, presumably related to air introduced via an indwelling catheter. Correlate clinically.        Electronically signed by: JULIA RAPP MD, MD  Date:     01/21/18  Time:    14:47              Narrative:    COMPARISON: None    FINDINGS: Inferior bilateral frontal lobe encephalomalacia, left greater than right and right parietal occipital watershed zone encephalomalacia. 5 mm coarse calcification within the right parietal watershed zone region of encephalomalacia, nonspecific. There is compensatory dilatation of the bilateral frontal horns and right posterior horn of the lateral ventricles. The ventricles are otherwise midline without distortion by mass effect.    No convincing evidence of hemorrhage, mass, midline shift or acute major vascular territory infarct.  There is superimposed age appropriate  generalized cerebral atrophy.  There are patchy and confluent low attenuation changes throughout the subcortical and periventricular white matter, nonspecific but can be seen with chronic small vessel ischemic disease.  No extra-axial  hemorrhage or mass. Skull base  vascular calcifications are noted.    Diffuse gas foci throughout the presumed venous system of the extracranial soft tissues and also bilateral cavernous sinuses.     The extracranial structures are otherwise within normal limits.  Calvarium is intact.  Visualized paranasal sinuses are clear.  Mastoid air cells are clear.                            Assessment/Plan:      * Hypoglycemia    - Patient had multiple bouts of hypoglycemia in ER requiring intervention  - BG Q4  - D5NS with 20K ordered, and well try weaning to 75/hr and wean off as tolerated.   - Monitor.             Hypomagnesemia    - Monitor and replace electrolytes PRN.             Iron deficiency anemia    - H/H- 9/28.6.  Don't believe this is the reason for being found unresponsive at home  - Fe, B-12, folate   - Continue home iron supplement  - Follow up with PCP.             Subcutaneous air, initial encounter    - CT- Redemonstration of scattered gas foci throughout the superficial and also deep venous structures of the imaged extracranial soft tissues, bilateral  spaces, bilateral cavernous sinuses and upper neck, anterior lower left neck, left subclavian vein and pulmonary trunk. Given the slight predominance on the left and also seen within the pulmonary trunk and left subclavian vein without identifiable cause such as sphenoid fracture or pneumomediastinum, this may be related to a current or recent indwelling venous catheter. Correlate clinically.  - Recent thyroid surgery approximately 1 month ago  - Will monitor          Type 2 diabetes mellitus    - A1c = 6.1  - On Metformin at home. Will hold and provide SSI        Hyperlipidemia    - Continue Crestor    Results for JOEY FANG (MRN [id number]) as of 1/23/2018 10:02   Ref. Range 1/22/2018 04:32   Cholesterol Latest Ref Range: 120 - 199 mg/dL 128   HDL Latest Ref Range: 40 - 75 mg/dL 32 (L)   LDL Cholesterol Latest Ref Range: 63.0 - 159.0 mg/dL 81.8    Total Cholesterol/HDL Ratio Latest Ref Range: 2.0 - 5.0  4.0   Triglycerides Latest Ref Range: 30 - 150 mg/dL 71           Hypotension    - Normotensive currently. Wife reported hypotension at home  - Midodrine continued          Seizure disorder    - Valproic level low (49). Could be non compliance. Concern for seizure being causative for being found down at home  - Continue Depakote at home dose   - Neurology noted:  Continue present dosage of Depakote.  Will not change the Depakote dosage as it is below therapeutic and is present seizure may have been precipitated by the hypoglycemic episode.  He has an appointment to see his neurologist at [facility] on February 9, at which time his medication dosage will be reviewed.  Discussed diagnosis with patient's spouse.            VTE Risk Mitigation         Ordered     Medium Risk of VTE  Once      01/21/18 2115     enoxaparin injection 40 mg  Daily     Route:  Subcutaneous        01/21/18 2115     Place RICHA hose  Until discontinued      01/21/18 2115     Place sequential compression device  Until discontinued      01/21/18 2115              Ernesto Severino MD  Department of Hospital Medicine   [facility]

## 2018-01-23 NOTE — ASSESSMENT & PLAN NOTE
- CT- Redemonstration of scattered gas foci throughout the superficial and also deep venous structures of the imaged extracranial soft tissues, bilateral  spaces, bilateral cavernous sinuses and upper neck, anterior lower left neck, left subclavian vein and pulmonary trunk. Given the slight predominance on the left and also seen within the pulmonary trunk and left subclavian vein without identifiable cause such as sphenoid fracture or pneumomediastinum, this may be related to a current or recent indwelling venous catheter. Correlate clinically.  - Recent thyroid surgery approximately 1 month ago  - Will monitor

## 2018-01-23 NOTE — PT/OT/SLP EVAL
Occupational Therapy   Evaluation/treatment    Name: Campbell Costa  MRN: [id number]  Admitting Diagnosis:  Hypoglycemia      Recommendations:     Discharge Recommendations: home health OT  Discharge Equipment Recommendations:  none  Barriers to discharge:  None    History:     Occupational Profile:  Living Environment: lives with wife in [facility] with threshold  Previous level of function: ambulated with SPC; uses rollator at times in community; dresses with  shorn Dar; Dar -Indep bathing, toileting. Uses public transportation and cousin drives. Wife does all cooking, cleaning and laundry.     Roles and Routines: fairly active  Equipment Owned:  cane, straight, ( shoe and sock horn; has access to TTB, BSC but not currently using; has gait belt)  Assistance upon Discharge: yes wife    Past Medical History:   Diagnosis Date    Anxiety     COPD (chronic obstructive pulmonary disease)     Diabetes mellitus     GERD (gastroesophageal reflux disease)     Hereditary acanthocytosis     Hypercholesterolemia     Hypertension     Iron deficiency anemia     Seizures     Stroke          Past Surgical History:   Procedure Laterality Date    CERVICAL FUSION      THYROIDECTOMY, PARTIAL Left     November 2017    TUMOR EXCISION      Fatty tumor excised from the right jaw       Subjective     Chief Complaint: back and leg pain  Patient/Family stated goals: none  Communicated with: nurse prior to session.  Pain/Comfort:  Pain Rating 1: 6/10  Location - Side 1: Bilateral  Location - Orientation 1: lower  Location 1: leg  Pain Addressed 1: Reposition, Distraction, Nurse notified  Pain Rating Post-Intervention 1: 6/10  Pain Rating 2: 10/10  Location - Side 2: Bilateral  Location - Orientation 2: lower  Location 2: back  Pain Addressed 2: Reposition, Distraction, Nurse notified, Cessation of Activity  Pain Rating Post-Intervention 2: 0/10    Patients cultural, spiritual, Holiness conflicts given the current situation:  na    Objective:     Patient found with: bed alarm, peripheral IV    General Precautions: Standard, fall, seizure, hearing impaired   Orthopedic Precautions:N/A   Braces: N/A     Occupational Performance:    Bed Mobility:    · Patient completed Rolling/Turning to Right with modified independence  · Patient completed Scooting/Bridging with modified independence  · Patient completed Supine to Sit with modified independence  · Patient completed Sit to Supine with modified independence    Functional Mobility/Transfers:  · Patient completed Sit <> Stand Transfer with stand by assistance  with  rolling walker   · Patient completed Toilet Transfer Step Transfer technique with contact guard assistance with  rolling walker  · Functional Mobility: CGA with RW bed<>bathroom>sink.    Activities of Daily Living:  · Feeding:  independence HOB elevated  · Grooming: contact guard assistance standing at sink with RW  · LB Dressing: dependence socks without adaptive device  · Toileting: contact guard assistance clothes management standing with RW    Cognitive/Visual Perceptual:  Cognitive/Psychosocial Skills:     -       Oriented to: Person, Place, Time and Situation   -       Follows Commands/attention:Follows one-step commands  -       Communication: clear/fluent  -       Memory: Impaired STM and Poor immediate recall  -       Safety awareness/insight to disability: impaired   -       Mood/Affect/Coping skills/emotional control: Appropriate to situation  Visual/Perceptual:      -Intact    Physical Exam:  Balance:    -       sitting:  good; dynamic sitting:  fair plus; static standing: fair ; dynamic standing: poor plus  Postural examination/scapula alignment:    -       No postural abnormalities identified  Skin integrity: Visible skin intact  Edema:  None noted  Sensation:    -       Intact  Dominant hand:    -       right  Upper Extremity Range of Motion:     -       Right Upper Extremity: WFL--amputated 5/4 PIPs and 2/3 PIP  "joints  -       Left Upper Extremity: WFL  Upper Extremity Strength:    -       Right Upper Extremity: WFL  -       Left Upper Extremity: WFL   Strength:    -       Right Upper Extremity: WFL  -       Left Upper Extremity: WFL    Patient left HOB elevated with all lines intact, call button in reach, bed alarm on and nurse notified    AMPA 6 Click:  [facility] Total Score: 19    Treatment & Education:  fx ambulation with CGA/HHA to bathroom,bed, sink; safety awareness.  Education:    Assessment:     Campbell Costa is a 67 y.o. male with a medical diagnosis of Hypoglycemia.  He presents with OT initial eval completed and treatment initiated.  Pt. Would benefit from HHOT.  Continue with OT POC. Pt. Has all necessary DME at home.  .  Performance deficits affecting function are weakness, impaired functional mobilty, decreased safety awareness, gait instability, impaired endurance, impaired balance, impaired self care skills.      Rehab Prognosis:  good; patient would benefit from acute skilled OT services to address these deficits and reach maximum level of function.         Clinical Decision Makin.  OT Low:  "Pt evaluation falls under low complexity for evaluation coding due to performance deficits noted in 1-3 areas as stated above and 0 co-morbities affecting current functional status. Data obtained from problem focused assessments. No modifications or assistance was required for completion of evaluation. Only brief occupational profile and history review completed."     Plan:     Patient to be seen 5 x/week to address the above listed problems via self-care/home management, therapeutic activities, therapeutic exercises  · Plan of Care Expires: 18  · Plan of Care Reviewed with: patient    This Plan of care has been discussed with the patient who was involved in its development and understands and is in agreement with the identified goals and treatment plan    GOALS:    Occupational Therapy Goals        " Problem: Occupational Therapy Goal    Goal Priority Disciplines Outcome Interventions   Occupational Therapy Goal     OT, PT/OT Ongoing (interventions implemented as appropriate)    Description:  Goals to be met by: 1/28/2018    Patient will increase functional independence with ADLs by performing:    UE Dressing with Supervision.  LE Dressing with Supervision and Assistive Devices as needed.  Grooming while standing at sink with Supervision.  Toileting from toilet with Supervision for hygiene and clothing management.   Stand pivot transfers with Supervision.  Toilet transfer to toilet with Supervision.                      Time Tracking:     OT Date of Treatment: 01/23/18  OT Start Time: 1008  OT Stop Time: 1032  OT Total Time (min): 24 min    Billable Minutes:Evaluation 15  Therapeutic Activity 9  Total Time 24    Luz Celaya OT  1/23/2018

## 2018-01-23 NOTE — ASSESSMENT & PLAN NOTE
- Continue Crestor    Results for JOEY FANG (MRN [id number]) as of 1/23/2018 10:02   Ref. Range 1/22/2018 04:32   Cholesterol Latest Ref Range: 120 - 199 mg/dL 128   HDL Latest Ref Range: 40 - 75 mg/dL 32 (L)   LDL Cholesterol Latest Ref Range: 63.0 - 159.0 mg/dL 81.8   Total Cholesterol/HDL Ratio Latest Ref Range: 2.0 - 5.0  4.0   Triglycerides Latest Ref Range: 30 - 150 mg/dL 71

## 2018-01-23 NOTE — PLAN OF CARE
Problem: Occupational Therapy Goal  Goal: Occupational Therapy Goal  Goals to be met by: 1/28/2018    Patient will increase functional independence with ADLs by performing:    UE Dressing with Supervision.  LE Dressing with Supervision and Assistive Devices as needed.  Grooming while standing at sink with Supervision.  Toileting from toilet with Supervision for hygiene and clothing management.   Stand pivot transfers with Supervision.  Toilet transfer to toilet with Supervision.    Outcome: Ongoing (interventions implemented as appropriate)  OT initial eval completed and treatment initiated.  Pt. Would benefit from HHOT.  Continue with OT POC. Pt. Has all necessary DME at home.

## 2018-01-23 NOTE — PLAN OF CARE
Problem: Physical Therapy Goal  Goal: Physical Therapy Goal  Goals to be met by: 18     Patient will increase functional independence with mobility by performin. Supine to sit with S  2. Sit to supine with S  3. Sit to stand transfer with S  4. Bed to chair transfer with S   5. Gait  x 150 feet with Supervision using rollator.   6. Ascend/descend 2 stair with left Handrails Minimal Assistance   7. Ascend/Descend 4 inch curb step with Minimal Assistance using rollator.    Outcome: Ongoing (interventions implemented as appropriate)  PT eval.  Trial with SPC -pt requiring CGA with amb.  Pt able to amb with Rollator for 100' and able to amb SBA   REC;  Home with HH  DME:  rollator

## 2018-01-23 NOTE — SUBJECTIVE & OBJECTIVE
Interval History:   Pt new to me.  Doing okay.      Review of Systems   Constitutional: Negative for appetite change and fever.   Respiratory: Negative for cough and shortness of breath.    Cardiovascular: Negative for chest pain and palpitations.   Gastrointestinal: Negative for nausea and vomiting.   Skin: Negative for color change and rash.   Neurological: Negative for dizziness and weakness.   Psychiatric/Behavioral: Negative for agitation and confusion.     Objective:     Vital Signs (Most Recent):  Temp: 97.6 °F (36.4 °C) (01/23/18 0746)  Pulse: 68 (01/23/18 0746)  Resp: 18 (01/23/18 0746)  BP: 139/83 (01/23/18 0746)  SpO2: 97 % (01/23/18 0746) Vital Signs (24h Range):  Temp:  [97.3 °F (36.3 °C)-98.4 °F (36.9 °C)] 97.6 °F (36.4 °C)  Pulse:  [67-73] 68  Resp:  [18] 18  SpO2:  [95 %-97 %] 97 %  BP: (113-146)/(65-83) 139/83     Weight: 75.8 kg (167 lb 1.7 oz)  Body mass index is 25.41 kg/m².    Intake/Output Summary (Last 24 hours) at 01/23/18 1127  Last data filed at 01/23/18 0432   Gross per 24 hour   Intake          2816.67 ml   Output             1700 ml   Net          1116.67 ml      Physical Exam   Constitutional: He is oriented to person, place, and time. He appears well-developed and well-nourished.   HENT:   Head: Normocephalic and atraumatic.   Eyes: Conjunctivae are normal. Pupils are equal, round, and reactive to light.   Neck: Normal range of motion. Neck supple.   Cardiovascular: Normal rate and regular rhythm.    Pulmonary/Chest: Effort normal and breath sounds normal.   Abdominal: Soft.   Musculoskeletal:   Fingers missing right hand   Neurological: He is alert and oriented to person, place, and time.   Skin: Skin is warm and dry.       Significant Labs:   BMP:   Recent Labs  Lab 01/23/18  0421   *      K 4.6      CO2 28   BUN 9   CREATININE 0.7   CALCIUM 8.4*   MG 1.5*     CBC:   Recent Labs  Lab 01/21/18  1417 01/22/18  0432 01/23/18  0421   WBC 5.76 4.87 5.24   HGB 9.0* 9.1*  8.7*   HCT 28.6* 28.5* 27.5*   * 156 133*       Significant Imaging: I have reviewed all pertinent imaging results/findings within the past 24 hours.   Imaging Results          CT Chest Without Contrast (Final result)  Result time 01/21/18 16:35:49    Final result by Julia Rapp MD (01/21/18 16:35:49)                 Impression:        1. Redemonstration of scattered gas foci throughout the superficial and also deep venous structures of the imaged extracranial soft tissues, bilateral  spaces, bilateral cavernous sinuses and upper neck, anterior lower left neck, left subclavian vein and pulmonary trunk. Given the slight predominance on the left and also seen within the pulmonary trunk and left subclavian vein without identifiable cause such as sphenoid fracture or pneumomediastinum, this may be related to a current or recent indwelling venous catheter. Correlate clinically.    2. Otherwise, no acute neck soft tissue or chest abnormality allowing for lack of intravenous contrast.    3. Absent left thyroid lobe which may be related to prior surgery. Right thyroid subcentimeter low attenuation focus which may represent a nodule. Further evaluation with elective/nonemergent thyroid ultrasound can be obtained as warranted.    4. Additional findings as above.      Electronically signed by: JULIA RAPP MD, MD  Date:     01/21/18  Time:    16:35              Narrative:    Technique:  Axial images were obtained through the cervical soft tissues and chest without intravenous contrast. Coronal and sagittal reformatted images were generated.    Comparison: Head CT and chest radiograph earlier same day    Findings: Please note that the lack of intravenous contrast limits evaluation of soft tissue and vascular structures.    CT soft tissue neck:  Once again, scattered gas foci throughout the superficial and also deep presumed venous structures of the imaged extracranial soft tissues, bilateral   spaces, bilateral cavernous sinuses and upper neck. Slightly more predominant on the left.    There is mucosal thickening within the left maxillary sinus.  No air fluid levels within the sinuses.  The remaining visualized paranasal sinuses and mastoid air cells are clear.    The orbits and orbital contents appear within normal limits.  The globes are symmetric and the ocular lenses are anteriorly located.  No acute fracture or dislocation.  There is mild leftward deviation of the nasal septum .  Bilateral mandibular condyles and TMJs appear relatively symmetric and intact.  The maxilla is edentulous. Several dental caries noted of the remaining anterior mandibular teeth.    The base of the tongue appears within normal limits.  No peritonsillar, parapharyngeal or retropharyngeal discrete mass or fluid collections identified, allowing for lack of intravenous contrast.  The airway is relatively midline and overall patent.  Epiglottis and uvula appear normal. The bilateral valleculae and piriform sinuses appear within normal limits.  The aryepiglottic folds are within normal limits.  The true and false vocal cords are grossly within normal limits.  No prevertebral soft tissue swelling or paraspinal hematoma.    The bilateral parotid, submandibular, and sublingual glands are normal in appearance.  There are scattered normal sized lymph nodes noted throughout the cervical region and submental and submandibular regions, but not enlarged by CT criteria.   No subcutaneous emphysema or radiodense foreign body.    Surgical changes of ACDF at C3-C6 levels without evidence of hardware failure or loosening. Multilevel mild to moderate degenerative change of the cervical spine. No acute displaced fracture or dislocation. Bilateral inferior frontal lobe and right parieto-occipital watershed zone encephalomalacia partially imaged. No acute findings seen within the included intracranial contents.      CT chest: A few gas foci seen  within anterior lower left neck and left subclavian presumed venous structures as well as anterior aspect of the pulmonary trunk.    Left thyroid lobe is not identified and likely surgically absent. Subcentimeter low-attenuation parenchymal focus within the anterior right thyroid lobe.    Extrathoracic soft tissues are within normal limits.    There is a left-sided arch. Minimal scattered atherosclerosis. The aorta is non-aneurysmal. Heart is normal in size without significant pericardial fluid. Mild coronary atherosclerosis. Minimal aortic valvular calcification. Esophagus is normal in course and caliber. No mediastinal, hilar or axillary lymphadenopathy. Pulmonary trunk is upper limits of normal in caliber. No pneumomediastinum.    The lungs demonstrate scattered areas of platelike scarring versus atelectasis primarily within the right lung. Mild dependent atelectasis, right or than left. There is mild architectural distortion with reticulation seen along the posterior lateral aspect of the right lung. No large consolidation, pleural effusion or pneumothorax. No suspicious pulmonary nodules. There is bilateral mild bronchiectasis, slightly more prominent on the right. Mild subpleural cystic change noted bilaterally.    Included upper abdomen is within normal limits. No acute displaced fracture, dislocation or destructive osseous process. Age-related degenerative change.                             CT Soft Tissue Neck WO Contrast (Final result)  Result time 01/21/18 16:35:49    Final result by Christopher Rapp MD (01/21/18 16:35:49)                 Impression:        1. Redemonstration of scattered gas foci throughout the superficial and also deep venous structures of the imaged extracranial soft tissues, bilateral  spaces, bilateral cavernous sinuses and upper neck, anterior lower left neck, left subclavian vein and pulmonary trunk. Given the slight predominance on the left and also seen within the  pulmonary trunk and left subclavian vein without identifiable cause such as sphenoid fracture or pneumomediastinum, this may be related to a current or recent indwelling venous catheter. Correlate clinically.    2. Otherwise, no acute neck soft tissue or chest abnormality allowing for lack of intravenous contrast.    3. Absent left thyroid lobe which may be related to prior surgery. Right thyroid subcentimeter low attenuation focus which may represent a nodule. Further evaluation with elective/nonemergent thyroid ultrasound can be obtained as warranted.    4. Additional findings as above.      Electronically signed by: JULIA REED MD, MD  Date:     01/21/18  Time:    16:35              Narrative:    Technique:  Axial images were obtained through the cervical soft tissues and chest without intravenous contrast. Coronal and sagittal reformatted images were generated.    Comparison: Head CT and chest radiograph earlier same day    Findings: Please note that the lack of intravenous contrast limits evaluation of soft tissue and vascular structures.    CT soft tissue neck:  Once again, scattered gas foci throughout the superficial and also deep presumed venous structures of the imaged extracranial soft tissues, bilateral  spaces, bilateral cavernous sinuses and upper neck. Slightly more predominant on the left.    There is mucosal thickening within the left maxillary sinus.  No air fluid levels within the sinuses.  The remaining visualized paranasal sinuses and mastoid air cells are clear.    The orbits and orbital contents appear within normal limits.  The globes are symmetric and the ocular lenses are anteriorly located.  No acute fracture or dislocation.  There is mild leftward deviation of the nasal septum .  Bilateral mandibular condyles and TMJs appear relatively symmetric and intact.  The maxilla is edentulous. Several dental caries noted of the remaining anterior mandibular teeth.    The base of the  tongue appears within normal limits.  No peritonsillar, parapharyngeal or retropharyngeal discrete mass or fluid collections identified, allowing for lack of intravenous contrast.  The airway is relatively midline and overall patent.  Epiglottis and uvula appear normal. The bilateral valleculae and piriform sinuses appear within normal limits.  The aryepiglottic folds are within normal limits.  The true and false vocal cords are grossly within normal limits.  No prevertebral soft tissue swelling or paraspinal hematoma.    The bilateral parotid, submandibular, and sublingual glands are normal in appearance.  There are scattered normal sized lymph nodes noted throughout the cervical region and submental and submandibular regions, but not enlarged by CT criteria.   No subcutaneous emphysema or radiodense foreign body.    Surgical changes of ACDF at C3-C6 levels without evidence of hardware failure or loosening. Multilevel mild to moderate degenerative change of the cervical spine. No acute displaced fracture or dislocation. Bilateral inferior frontal lobe and right parieto-occipital watershed zone encephalomalacia partially imaged. No acute findings seen within the included intracranial contents.      CT chest: A few gas foci seen within anterior lower left neck and left subclavian presumed venous structures as well as anterior aspect of the pulmonary trunk.    Left thyroid lobe is not identified and likely surgically absent. Subcentimeter low-attenuation parenchymal focus within the anterior right thyroid lobe.    Extrathoracic soft tissues are within normal limits.    There is a left-sided arch. Minimal scattered atherosclerosis. The aorta is non-aneurysmal. Heart is normal in size without significant pericardial fluid. Mild coronary atherosclerosis. Minimal aortic valvular calcification. Esophagus is normal in course and caliber. No mediastinal, hilar or axillary lymphadenopathy. Pulmonary trunk is upper limits of  normal in caliber. No pneumomediastinum.    The lungs demonstrate scattered areas of platelike scarring versus atelectasis primarily within the right lung. Mild dependent atelectasis, right or than left. There is mild architectural distortion with reticulation seen along the posterior lateral aspect of the right lung. No large consolidation, pleural effusion or pneumothorax. No suspicious pulmonary nodules. There is bilateral mild bronchiectasis, slightly more prominent on the right. Mild subpleural cystic change noted bilaterally.    Included upper abdomen is within normal limits. No acute displaced fracture, dislocation or destructive osseous process. Age-related degenerative change.                             X-Ray Chest AP Portable (Final result)  Result time 01/21/18 14:41:45    Final result by Marshall Harley MD (01/21/18 14:41:45)                 Impression:     No acute cardiopulmonary process.      Electronically signed by: MARSHALL HARLEY MD  Date:     01/21/18  Time:    14:41              Narrative:    Chest x-ray, 1 view    Comparison: None.    Findings: ACDF hardware noted. There is no consolidation, effusion, or pneumothorax.  Cardiomediastinal silhouette is unremarkable.                             CT Head Without Contrast (Final result)  Result time 01/21/18 14:47:37    Final result by Julia Rapp MD (01/21/18 14:47:37)                 Impression:        1.  No acute intracranial findings identified. Specifically, no intracranial hemorrhage.    2.  Bilateral frontal lobe and right parieto-occipital watershed zone encephalomalacia suggesting sequela of remote infarct and/or trauma.    3. Senescent changes as above.    4.  Diffuse gas foci throughout the presumed venous system of the extracranial soft tissues and also bilateral cavernous sinuses, presumably related to air introduced via an indwelling catheter. Correlate clinically.        Electronically signed by: JULIA RAPP MD, MD  Date:      01/21/18  Time:    14:47              Narrative:    COMPARISON: None    FINDINGS: Inferior bilateral frontal lobe encephalomalacia, left greater than right and right parietal occipital watershed zone encephalomalacia. 5 mm coarse calcification within the right parietal watershed zone region of encephalomalacia, nonspecific. There is compensatory dilatation of the bilateral frontal horns and right posterior horn of the lateral ventricles. The ventricles are otherwise midline without distortion by mass effect.    No convincing evidence of hemorrhage, mass, midline shift or acute major vascular territory infarct.  There is superimposed age appropriate  generalized cerebral atrophy.  There are patchy and confluent low attenuation changes throughout the subcortical and periventricular white matter, nonspecific but can be seen with chronic small vessel ischemic disease.  No extra-axial hemorrhage or mass. Skull base  vascular calcifications are noted.    Diffuse gas foci throughout the presumed venous system of the extracranial soft tissues and also bilateral cavernous sinuses.     The extracranial structures are otherwise within normal limits.  Calvarium is intact.  Visualized paranasal sinuses are clear.  Mastoid air cells are clear.

## 2018-01-23 NOTE — ASSESSMENT & PLAN NOTE
- H/H- 9/28.6.  Don't believe this is the reason for being found unresponsive at home  - Fe, B-12, folate   - Continue home iron supplement  - Follow up with PCP.

## 2018-01-24 VITALS
DIASTOLIC BLOOD PRESSURE: 68 MMHG | BODY MASS INDEX: 25.33 KG/M2 | SYSTOLIC BLOOD PRESSURE: 120 MMHG | HEIGHT: 68 IN | HEART RATE: 72 BPM | RESPIRATION RATE: 18 BRPM | TEMPERATURE: 98 F | WEIGHT: 167.13 LBS | OXYGEN SATURATION: 98 %

## 2018-01-24 PROBLEM — E83.42 HYPOMAGNESEMIA: Status: RESOLVED | Noted: 2018-01-23 | Resolved: 2018-01-24

## 2018-01-24 PROBLEM — I95.9 HYPOTENSION: Status: RESOLVED | Noted: 2018-01-21 | Resolved: 2018-01-24

## 2018-01-24 LAB
ALBUMIN SERPL BCP-MCNC: 2.5 G/DL
ALP SERPL-CCNC: 63 U/L
ALT SERPL W/O P-5'-P-CCNC: 20 U/L
ANION GAP SERPL CALC-SCNC: 8 MMOL/L
AST SERPL-CCNC: 25 U/L
BASOPHILS # BLD AUTO: 0.01 K/UL
BASOPHILS NFR BLD: 0.2 %
BILIRUB SERPL-MCNC: 0.2 MG/DL
BUN SERPL-MCNC: 10 MG/DL
CALCIUM SERPL-MCNC: 8.9 MG/DL
CHLORIDE SERPL-SCNC: 109 MMOL/L
CO2 SERPL-SCNC: 27 MMOL/L
CREAT SERPL-MCNC: 0.7 MG/DL
DIFFERENTIAL METHOD: ABNORMAL
EOSINOPHIL # BLD AUTO: 0.2 K/UL
EOSINOPHIL NFR BLD: 3 %
ERYTHROCYTE [DISTWIDTH] IN BLOOD BY AUTOMATED COUNT: 18.9 %
EST. GFR  (AFRICAN AMERICAN): >60 ML/MIN/1.73 M^2
EST. GFR  (NON AFRICAN AMERICAN): >60 ML/MIN/1.73 M^2
FERRITIN SERPL-MCNC: 72 NG/ML
FOLATE SERPL-MCNC: 8.9 NG/ML
GLUCOSE SERPL-MCNC: 95 MG/DL
HCT VFR BLD AUTO: 28 %
HGB BLD-MCNC: 8.9 G/DL
IRON SERPL-MCNC: 76 UG/DL
LYMPHOCYTES # BLD AUTO: 2.9 K/UL
LYMPHOCYTES NFR BLD: 55 %
MAGNESIUM SERPL-MCNC: 1.7 MG/DL
MCH RBC QN AUTO: 23.9 PG
MCHC RBC AUTO-ENTMCNC: 31.8 G/DL
MCV RBC AUTO: 75 FL
MONOCYTES # BLD AUTO: 0.5 K/UL
MONOCYTES NFR BLD: 8.8 %
NEUTROPHILS # BLD AUTO: 1.8 K/UL
NEUTROPHILS NFR BLD: 32.8 %
PHOSPHATE SERPL-MCNC: 3.4 MG/DL
PLATELET # BLD AUTO: 170 K/UL
PMV BLD AUTO: 10.4 FL
POCT GLUCOSE: 140 MG/DL (ref 70–110)
POCT GLUCOSE: 164 MG/DL (ref 70–110)
POCT GLUCOSE: 97 MG/DL (ref 70–110)
POTASSIUM SERPL-SCNC: 4.8 MMOL/L
PROT SERPL-MCNC: 6.3 G/DL
RBC # BLD AUTO: 3.73 M/UL
SATURATED IRON: 30 %
SODIUM SERPL-SCNC: 144 MMOL/L
TOTAL IRON BINDING CAPACITY: 256 UG/DL
TRANSFERRIN SERPL-MCNC: 173 MG/DL
VIT B12 SERPL-MCNC: 1179 PG/ML
WBC # BLD AUTO: 5.35 K/UL

## 2018-01-24 PROCEDURE — 36415 COLL VENOUS BLD VENIPUNCTURE: CPT

## 2018-01-24 PROCEDURE — 85025 COMPLETE CBC W/AUTO DIFF WBC: CPT

## 2018-01-24 PROCEDURE — 99238 HOSP IP/OBS DSCHRG MGMT 30/<: CPT | Mod: ,,, | Performed by: HOSPITALIST

## 2018-01-24 PROCEDURE — 83735 ASSAY OF MAGNESIUM: CPT

## 2018-01-24 PROCEDURE — 84100 ASSAY OF PHOSPHORUS: CPT

## 2018-01-24 PROCEDURE — 25000003 PHARM REV CODE 250: Performed by: NURSE PRACTITIONER

## 2018-01-24 PROCEDURE — 82728 ASSAY OF FERRITIN: CPT

## 2018-01-24 PROCEDURE — 82746 ASSAY OF FOLIC ACID SERUM: CPT

## 2018-01-24 PROCEDURE — 82607 VITAMIN B-12: CPT

## 2018-01-24 PROCEDURE — 80053 COMPREHEN METABOLIC PANEL: CPT

## 2018-01-24 PROCEDURE — 83540 ASSAY OF IRON: CPT

## 2018-01-24 RX ORDER — INSULIN PUMP SYRINGE, 3 ML
EACH MISCELLANEOUS
Qty: 1 EACH | Refills: 0 | Status: SHIPPED | OUTPATIENT
Start: 2018-01-24 | End: 2019-01-24

## 2018-01-24 RX ADMIN — ESCITALOPRAM 10 MG: 10 TABLET, FILM COATED ORAL at 08:01

## 2018-01-24 RX ADMIN — GABAPENTIN 300 MG: 300 CAPSULE ORAL at 05:01

## 2018-01-24 RX ADMIN — TAMSULOSIN HYDROCHLORIDE 0.4 MG: 0.4 CAPSULE ORAL at 08:01

## 2018-01-24 RX ADMIN — PANTOPRAZOLE SODIUM 40 MG: 40 TABLET, DELAYED RELEASE ORAL at 08:01

## 2018-01-24 RX ADMIN — FERROUS SULFATE TAB EC 325 MG (65 MG FE EQUIVALENT) 325 MG: 325 (65 FE) TABLET DELAYED RESPONSE at 08:01

## 2018-01-24 RX ADMIN — ASPIRIN 81 MG: 81 TABLET, COATED ORAL at 08:01

## 2018-01-24 RX ADMIN — MIDODRINE HYDROCHLORIDE 2.5 MG: 2.5 TABLET ORAL at 05:01

## 2018-01-24 RX ADMIN — MIDODRINE HYDROCHLORIDE 2.5 MG: 2.5 TABLET ORAL at 01:01

## 2018-01-24 RX ADMIN — STANDARDIZED SENNA CONCENTRATE AND DOCUSATE SODIUM 1 TABLET: 8.6; 5 TABLET, FILM COATED ORAL at 08:01

## 2018-01-24 RX ADMIN — GABAPENTIN 300 MG: 300 CAPSULE ORAL at 01:01

## 2018-01-24 RX ADMIN — HYDROCODONE BITARTRATE AND ACETAMINOPHEN 1 TABLET: 7.5; 325 TABLET ORAL at 01:01

## 2018-01-24 RX ADMIN — DIVALPROEX SODIUM 500 MG: 500 TABLET, FILM COATED, EXTENDED RELEASE ORAL at 08:01

## 2018-01-24 NOTE — HOSPITAL COURSE
Was treated with D5 IVF which was weaned off.  Blood glucose 140 at discharge after IVF stopped earlier in day.  Hgb A1c = 6.1.  Will home metformin and have follow up with PCP.  Would not think that metformin should be issue, but can be resumed by PCP.

## 2018-01-24 NOTE — SUBJECTIVE & OBJECTIVE
Interval History:   Doing okay.  Tolerating PO.      Review of Systems   Constitutional: Negative for appetite change and fever.   Respiratory: Negative for cough and shortness of breath.    Cardiovascular: Negative for chest pain and palpitations.   Gastrointestinal: Negative for nausea and vomiting.   Skin: Negative for color change and rash.   Neurological: Negative for dizziness and weakness.   Psychiatric/Behavioral: Negative for agitation and confusion.     Objective:     Vital Signs (Most Recent):  Temp: 97.4 °F (36.3 °C) (01/24/18 0730)  Pulse: 65 (01/24/18 0730)  Resp: 18 (01/24/18 0730)  BP: 132/80 (01/24/18 0730)  SpO2: 97 % (01/24/18 0730) Vital Signs (24h Range):  Temp:  [97.4 °F (36.3 °C)-98.3 °F (36.8 °C)] 97.4 °F (36.3 °C)  Pulse:  [65-80] 65  Resp:  [18-20] 18  SpO2:  [96 %-99 %] 97 %  BP: (106-147)/(65-84) 132/80     Weight: 75.8 kg (167 lb 1.7 oz)  Body mass index is 25.41 kg/m².    Intake/Output Summary (Last 24 hours) at 01/24/18 1033  Last data filed at 01/24/18 0500   Gross per 24 hour   Intake              600 ml   Output              600 ml   Net                0 ml      Physical Exam   Constitutional: He is oriented to person, place, and time. He appears well-developed and well-nourished.   HENT:   Head: Normocephalic and atraumatic.   Eyes: Conjunctivae are normal. Pupils are equal, round, and reactive to light.   Neck: Normal range of motion. Neck supple.   Cardiovascular: Normal rate and regular rhythm.    Pulmonary/Chest: Effort normal and breath sounds normal.   Abdominal: Soft.   Musculoskeletal:   Fingers missing right hand, has thumb   Neurological: He is alert and oriented to person, place, and time.   Skin: Skin is warm and dry.       Significant Labs:   BMP:   Recent Labs  Lab 01/24/18  0458   GLU 95      K 4.8      CO2 27   BUN 10   CREATININE 0.7   CALCIUM 8.9   MG 1.7     CBC:   Recent Labs  Lab 01/23/18  0421 01/24/18  0458   WBC 5.24 5.35   HGB 8.7* 8.9*   HCT  27.5* 28.0*   * 170       Significant Imaging: I have reviewed all pertinent imaging results/findings within the past 24 hours.   Imaging Results          CT Chest Without Contrast (Final result)  Result time 01/21/18 16:35:49    Final result by Julia Rapp MD (01/21/18 16:35:49)                 Impression:        1. Redemonstration of scattered gas foci throughout the superficial and also deep venous structures of the imaged extracranial soft tissues, bilateral  spaces, bilateral cavernous sinuses and upper neck, anterior lower left neck, left subclavian vein and pulmonary trunk. Given the slight predominance on the left and also seen within the pulmonary trunk and left subclavian vein without identifiable cause such as sphenoid fracture or pneumomediastinum, this may be related to a current or recent indwelling venous catheter. Correlate clinically.    2. Otherwise, no acute neck soft tissue or chest abnormality allowing for lack of intravenous contrast.    3. Absent left thyroid lobe which may be related to prior surgery. Right thyroid subcentimeter low attenuation focus which may represent a nodule. Further evaluation with elective/nonemergent thyroid ultrasound can be obtained as warranted.    4. Additional findings as above.      Electronically signed by: JULIA RAPP MD, MD  Date:     01/21/18  Time:    16:35              Narrative:    Technique:  Axial images were obtained through the cervical soft tissues and chest without intravenous contrast. Coronal and sagittal reformatted images were generated.    Comparison: Head CT and chest radiograph earlier same day    Findings: Please note that the lack of intravenous contrast limits evaluation of soft tissue and vascular structures.    CT soft tissue neck:  Once again, scattered gas foci throughout the superficial and also deep presumed venous structures of the imaged extracranial soft tissues, bilateral  spaces, bilateral  cavernous sinuses and upper neck. Slightly more predominant on the left.    There is mucosal thickening within the left maxillary sinus.  No air fluid levels within the sinuses.  The remaining visualized paranasal sinuses and mastoid air cells are clear.    The orbits and orbital contents appear within normal limits.  The globes are symmetric and the ocular lenses are anteriorly located.  No acute fracture or dislocation.  There is mild leftward deviation of the nasal septum .  Bilateral mandibular condyles and TMJs appear relatively symmetric and intact.  The maxilla is edentulous. Several dental caries noted of the remaining anterior mandibular teeth.    The base of the tongue appears within normal limits.  No peritonsillar, parapharyngeal or retropharyngeal discrete mass or fluid collections identified, allowing for lack of intravenous contrast.  The airway is relatively midline and overall patent.  Epiglottis and uvula appear normal. The bilateral valleculae and piriform sinuses appear within normal limits.  The aryepiglottic folds are within normal limits.  The true and false vocal cords are grossly within normal limits.  No prevertebral soft tissue swelling or paraspinal hematoma.    The bilateral parotid, submandibular, and sublingual glands are normal in appearance.  There are scattered normal sized lymph nodes noted throughout the cervical region and submental and submandibular regions, but not enlarged by CT criteria.   No subcutaneous emphysema or radiodense foreign body.    Surgical changes of ACDF at C3-C6 levels without evidence of hardware failure or loosening. Multilevel mild to moderate degenerative change of the cervical spine. No acute displaced fracture or dislocation. Bilateral inferior frontal lobe and right parieto-occipital watershed zone encephalomalacia partially imaged. No acute findings seen within the included intracranial contents.      CT chest: A few gas foci seen within anterior  lower left neck and left subclavian presumed venous structures as well as anterior aspect of the pulmonary trunk.    Left thyroid lobe is not identified and likely surgically absent. Subcentimeter low-attenuation parenchymal focus within the anterior right thyroid lobe.    Extrathoracic soft tissues are within normal limits.    There is a left-sided arch. Minimal scattered atherosclerosis. The aorta is non-aneurysmal. Heart is normal in size without significant pericardial fluid. Mild coronary atherosclerosis. Minimal aortic valvular calcification. Esophagus is normal in course and caliber. No mediastinal, hilar or axillary lymphadenopathy. Pulmonary trunk is upper limits of normal in caliber. No pneumomediastinum.    The lungs demonstrate scattered areas of platelike scarring versus atelectasis primarily within the right lung. Mild dependent atelectasis, right or than left. There is mild architectural distortion with reticulation seen along the posterior lateral aspect of the right lung. No large consolidation, pleural effusion or pneumothorax. No suspicious pulmonary nodules. There is bilateral mild bronchiectasis, slightly more prominent on the right. Mild subpleural cystic change noted bilaterally.    Included upper abdomen is within normal limits. No acute displaced fracture, dislocation or destructive osseous process. Age-related degenerative change.                             CT Soft Tissue Neck WO Contrast (Final result)  Result time 01/21/18 16:35:49    Final result by Christopher Rapp MD (01/21/18 16:35:49)                 Impression:        1. Redemonstration of scattered gas foci throughout the superficial and also deep venous structures of the imaged extracranial soft tissues, bilateral  spaces, bilateral cavernous sinuses and upper neck, anterior lower left neck, left subclavian vein and pulmonary trunk. Given the slight predominance on the left and also seen within the pulmonary trunk and  left subclavian vein without identifiable cause such as sphenoid fracture or pneumomediastinum, this may be related to a current or recent indwelling venous catheter. Correlate clinically.    2. Otherwise, no acute neck soft tissue or chest abnormality allowing for lack of intravenous contrast.    3. Absent left thyroid lobe which may be related to prior surgery. Right thyroid subcentimeter low attenuation focus which may represent a nodule. Further evaluation with elective/nonemergent thyroid ultrasound can be obtained as warranted.    4. Additional findings as above.      Electronically signed by: JULIA REED MD, MD  Date:     01/21/18  Time:    16:35              Narrative:    Technique:  Axial images were obtained through the cervical soft tissues and chest without intravenous contrast. Coronal and sagittal reformatted images were generated.    Comparison: Head CT and chest radiograph earlier same day    Findings: Please note that the lack of intravenous contrast limits evaluation of soft tissue and vascular structures.    CT soft tissue neck:  Once again, scattered gas foci throughout the superficial and also deep presumed venous structures of the imaged extracranial soft tissues, bilateral  spaces, bilateral cavernous sinuses and upper neck. Slightly more predominant on the left.    There is mucosal thickening within the left maxillary sinus.  No air fluid levels within the sinuses.  The remaining visualized paranasal sinuses and mastoid air cells are clear.    The orbits and orbital contents appear within normal limits.  The globes are symmetric and the ocular lenses are anteriorly located.  No acute fracture or dislocation.  There is mild leftward deviation of the nasal septum .  Bilateral mandibular condyles and TMJs appear relatively symmetric and intact.  The maxilla is edentulous. Several dental caries noted of the remaining anterior mandibular teeth.    The base of the tongue appears within  normal limits.  No peritonsillar, parapharyngeal or retropharyngeal discrete mass or fluid collections identified, allowing for lack of intravenous contrast.  The airway is relatively midline and overall patent.  Epiglottis and uvula appear normal. The bilateral valleculae and piriform sinuses appear within normal limits.  The aryepiglottic folds are within normal limits.  The true and false vocal cords are grossly within normal limits.  No prevertebral soft tissue swelling or paraspinal hematoma.    The bilateral parotid, submandibular, and sublingual glands are normal in appearance.  There are scattered normal sized lymph nodes noted throughout the cervical region and submental and submandibular regions, but not enlarged by CT criteria.   No subcutaneous emphysema or radiodense foreign body.    Surgical changes of ACDF at C3-C6 levels without evidence of hardware failure or loosening. Multilevel mild to moderate degenerative change of the cervical spine. No acute displaced fracture or dislocation. Bilateral inferior frontal lobe and right parieto-occipital watershed zone encephalomalacia partially imaged. No acute findings seen within the included intracranial contents.      CT chest: A few gas foci seen within anterior lower left neck and left subclavian presumed venous structures as well as anterior aspect of the pulmonary trunk.    Left thyroid lobe is not identified and likely surgically absent. Subcentimeter low-attenuation parenchymal focus within the anterior right thyroid lobe.    Extrathoracic soft tissues are within normal limits.    There is a left-sided arch. Minimal scattered atherosclerosis. The aorta is non-aneurysmal. Heart is normal in size without significant pericardial fluid. Mild coronary atherosclerosis. Minimal aortic valvular calcification. Esophagus is normal in course and caliber. No mediastinal, hilar or axillary lymphadenopathy. Pulmonary trunk is upper limits of normal in caliber. No  pneumomediastinum.    The lungs demonstrate scattered areas of platelike scarring versus atelectasis primarily within the right lung. Mild dependent atelectasis, right or than left. There is mild architectural distortion with reticulation seen along the posterior lateral aspect of the right lung. No large consolidation, pleural effusion or pneumothorax. No suspicious pulmonary nodules. There is bilateral mild bronchiectasis, slightly more prominent on the right. Mild subpleural cystic change noted bilaterally.    Included upper abdomen is within normal limits. No acute displaced fracture, dislocation or destructive osseous process. Age-related degenerative change.                             X-Ray Chest AP Portable (Final result)  Result time 01/21/18 14:41:45    Final result by Marshall Harley MD (01/21/18 14:41:45)                 Impression:     No acute cardiopulmonary process.      Electronically signed by: MARSHALL HARLEY MD  Date:     01/21/18  Time:    14:41              Narrative:    Chest x-ray, 1 view    Comparison: None.    Findings: ACDF hardware noted. There is no consolidation, effusion, or pneumothorax.  Cardiomediastinal silhouette is unremarkable.                             CT Head Without Contrast (Final result)  Result time 01/21/18 14:47:37    Final result by Julia Rapp MD (01/21/18 14:47:37)                 Impression:        1.  No acute intracranial findings identified. Specifically, no intracranial hemorrhage.    2.  Bilateral frontal lobe and right parieto-occipital watershed zone encephalomalacia suggesting sequela of remote infarct and/or trauma.    3. Senescent changes as above.    4.  Diffuse gas foci throughout the presumed venous system of the extracranial soft tissues and also bilateral cavernous sinuses, presumably related to air introduced via an indwelling catheter. Correlate clinically.        Electronically signed by: JULIA RAPP MD, MD  Date:     01/21/18  Time:    14:47               Narrative:    COMPARISON: None    FINDINGS: Inferior bilateral frontal lobe encephalomalacia, left greater than right and right parietal occipital watershed zone encephalomalacia. 5 mm coarse calcification within the right parietal watershed zone region of encephalomalacia, nonspecific. There is compensatory dilatation of the bilateral frontal horns and right posterior horn of the lateral ventricles. The ventricles are otherwise midline without distortion by mass effect.    No convincing evidence of hemorrhage, mass, midline shift or acute major vascular territory infarct.  There is superimposed age appropriate  generalized cerebral atrophy.  There are patchy and confluent low attenuation changes throughout the subcortical and periventricular white matter, nonspecific but can be seen with chronic small vessel ischemic disease.  No extra-axial hemorrhage or mass. Skull base  vascular calcifications are noted.    Diffuse gas foci throughout the presumed venous system of the extracranial soft tissues and also bilateral cavernous sinuses.     The extracranial structures are otherwise within normal limits.  Calvarium is intact.  Visualized paranasal sinuses are clear.  Mastoid air cells are clear.

## 2018-01-24 NOTE — DISCHARGE SUMMARY
[facility] Hospital Medicine  Discharge Summary      Patient Name: Campbell Costa  MRN: [id number]  Admission Date: 1/21/2018  Hospital Length of Stay: 2 days  Discharge Date and Time:  01/24/2018 1:23 PM  Attending Physician: Ernesto Severino MD   Discharging Provider: Ernesto Severino MD  Primary Care Provider: Jim Croft MD      HPI:   This is a 67 y.o. male with a history of COPD, Dm, HTN, seizures, and stroke who presents with complaint of fatigue that began today. Patient reports wife found him on the floor. Patient is unsure of how he got there. Wife reports patient was confused after the episode. While he was on the floor, wife reports patient had low blood pressure (75/48). Wife reports patient seems less responsive that usual. He denies any fever, chills, congestion, cough, chest pain, nausea, vomiting, or dysuria. Wife reports giving the patient morning medications and a muscle relaxer for back pain prior to the episode. Patient reports keppra was increased about three weeks ago.        * No surgery found *      Hospital Course:   Was treated with D5 IVF which was weaned off.  Blood glucose 140 at discharge after IVF stopped earlier in day.  Hgb A1c = 6.1.  Will home metformin and have follow up with PCP.  Would not think that metformin should be issue, but can be resumed by PCP.      Consults:   Consults         Status Ordering Provider     Inpatient consult to Neurology  Once     Provider:  Mike Landon MD    Acknowledged CHADWICK SUAREZ          * Hypoglycemia    - Patient had multiple bouts of hypoglycemia in ER requiring intervention  - BG monitored Q4  - DC'd D5 IVFs and blood glucose was stable and not decreasing.    - Discussed with patient to follow up quickly with PCP as metformin may likely be restarted.             Iron deficiency anemia    - H/H- 9/28.6.  Don't believe this is the reason for being found unresponsive at home  - B-12, folate WNL.   - Continue home iron  supplement  - Follow up with PCP.             Subcutaneous air, initial encounter    - CT- Redemonstration of scattered gas foci throughout the superficial and also deep venous structures of the imaged extracranial soft tissues, bilateral  spaces, bilateral cavernous sinuses and upper neck, anterior lower left neck, left subclavian vein and pulmonary trunk. Given the slight predominance on the left and also seen within the pulmonary trunk and left subclavian vein without identifiable cause such as sphenoid fracture or pneumomediastinum, this may be related to a current or recent indwelling venous catheter. Correlate clinically.  - Recent thyroid surgery approximately 1 month ago  - No issue clinically.           Type 2 diabetes mellitus    - A1c = 6.1  - On Metformin at home. Will hold at discharge.  PCP may resume.           Hyperlipidemia    - Continue Crestor    Results for JOEY FANG (MRN [id number]) as of 1/23/2018 10:02   Ref. Range 1/22/2018 04:32   Cholesterol Latest Ref Range: 120 - 199 mg/dL 128   HDL Latest Ref Range: 40 - 75 mg/dL 32 (L)   LDL Cholesterol Latest Ref Range: 63.0 - 159.0 mg/dL 81.8   Total Cholesterol/HDL Ratio Latest Ref Range: 2.0 - 5.0  4.0   Triglycerides Latest Ref Range: 30 - 150 mg/dL 71           Seizure disorder    - Valproic level low (49). Could be non compliance. Concern for seizure being causative for being found down at home  - Continue Depakote at home dose   - Neurology noted:  Continue present dosage of Depakote.  Will not change the Depakote dosage as it is below therapeutic and is present seizure may have been precipitated by the hypoglycemic episode.  He has an appointment to see his neurologist at [facility] on February 9, at which time his medication dosage will be reviewed.  Discussed diagnosis with patient's spouse.          Doing okay and will discharge home.       Final Active Diagnoses:    Diagnosis Date Noted POA    PRINCIPAL PROBLEM:  Hypoglycemia  "[E16.2] 01/21/2018 Yes    Seizure disorder [G40.909] 01/21/2018 Yes    Hyperlipidemia [E78.5] 01/21/2018 Yes    Type 2 diabetes mellitus [E11.9] 01/21/2018 Yes    Subcutaneous air, initial encounter [T79.7XXA] 01/21/2018 Yes    Iron deficiency anemia [D50.9] 01/21/2018 Yes      Problems Resolved During this Admission:    Diagnosis Date Noted Date Resolved POA    Hypomagnesemia [E83.42] 01/23/2018 01/24/2018 Yes    Hypotension [I95.9] 01/21/2018 01/24/2018 Yes       Discharged Condition: good    Disposition: Home or Self Care    Follow Up:  Follow-up Information     Jim Croft MD. Schedule an appointment as soon as possible for a visit in 3 days.    Specialty:  Internal Medicine  Contact information:  [facility]  [facility] [address]  [phone]                 Patient Instructions:     WALKER FOR HOME USE   Order Specific Question Answer Comments   Type of Walker: Rollator    With wheels? Yes    Height: 5' 8" (1.727 m)    Weight: 75.8 kg (167 lb 1.7 oz)    Length of need (1-99 months): 99    Does patient have medical equipment at home? cane, straight LH shoe and sock horn; has access to TTB, BSC but not currently using; has gait belt / LH shoe and sock horn; has access to TTB, BSC but not currently using; has gait belt   Does patient have medical equipment at home? rollator    Please check all that apply: Patient's condition impairs ambulation.    Please check all that apply: Patient is unable to safely ambulate without equipment.      Diet diabetic     Activity as tolerated     Notify your health care provider if you experience any of the following:  increased confusion or weakness     Notify your health care provider if you experience any of the following:  persistent dizziness, light-headedness, or visual disturbances     Notify your health care provider if you experience any of the following:  worsening rash     Notify your health care provider if you experience any of the following:  severe " persistent headache     Notify your health care provider if you experience any of the following:  difficulty breathing or increased cough     Notify your health care provider if you experience any of the following:  severe uncontrolled pain     Notify your health care provider if you experience any of the following:  persistent nausea and vomiting or diarrhea     Notify your health care provider if you experience any of the following:  temperature >100.4         Significant Diagnostic Studies:   Imaging Results          CT Chest Without Contrast (Final result)  Result time 01/21/18 16:35:49    Final result by Julia Rapp MD (01/21/18 16:35:49)                 Impression:        1. Redemonstration of scattered gas foci throughout the superficial and also deep venous structures of the imaged extracranial soft tissues, bilateral  spaces, bilateral cavernous sinuses and upper neck, anterior lower left neck, left subclavian vein and pulmonary trunk. Given the slight predominance on the left and also seen within the pulmonary trunk and left subclavian vein without identifiable cause such as sphenoid fracture or pneumomediastinum, this may be related to a current or recent indwelling venous catheter. Correlate clinically.    2. Otherwise, no acute neck soft tissue or chest abnormality allowing for lack of intravenous contrast.    3. Absent left thyroid lobe which may be related to prior surgery. Right thyroid subcentimeter low attenuation focus which may represent a nodule. Further evaluation with elective/nonemergent thyroid ultrasound can be obtained as warranted.    4. Additional findings as above.      Electronically signed by: JULIA RAPP MD, MD  Date:     01/21/18  Time:    16:35              Narrative:    Technique:  Axial images were obtained through the cervical soft tissues and chest without intravenous contrast. Coronal and sagittal reformatted images were generated.    Comparison: Head CT and  chest radiograph earlier same day    Findings: Please note that the lack of intravenous contrast limits evaluation of soft tissue and vascular structures.    CT soft tissue neck:  Once again, scattered gas foci throughout the superficial and also deep presumed venous structures of the imaged extracranial soft tissues, bilateral  spaces, bilateral cavernous sinuses and upper neck. Slightly more predominant on the left.    There is mucosal thickening within the left maxillary sinus.  No air fluid levels within the sinuses.  The remaining visualized paranasal sinuses and mastoid air cells are clear.    The orbits and orbital contents appear within normal limits.  The globes are symmetric and the ocular lenses are anteriorly located.  No acute fracture or dislocation.  There is mild leftward deviation of the nasal septum .  Bilateral mandibular condyles and TMJs appear relatively symmetric and intact.  The maxilla is edentulous. Several dental caries noted of the remaining anterior mandibular teeth.    The base of the tongue appears within normal limits.  No peritonsillar, parapharyngeal or retropharyngeal discrete mass or fluid collections identified, allowing for lack of intravenous contrast.  The airway is relatively midline and overall patent.  Epiglottis and uvula appear normal. The bilateral valleculae and piriform sinuses appear within normal limits.  The aryepiglottic folds are within normal limits.  The true and false vocal cords are grossly within normal limits.  No prevertebral soft tissue swelling or paraspinal hematoma.    The bilateral parotid, submandibular, and sublingual glands are normal in appearance.  There are scattered normal sized lymph nodes noted throughout the cervical region and submental and submandibular regions, but not enlarged by CT criteria.   No subcutaneous emphysema or radiodense foreign body.    Surgical changes of ACDF at C3-C6 levels without evidence of hardware failure  or loosening. Multilevel mild to moderate degenerative change of the cervical spine. No acute displaced fracture or dislocation. Bilateral inferior frontal lobe and right parieto-occipital watershed zone encephalomalacia partially imaged. No acute findings seen within the included intracranial contents.      CT chest: A few gas foci seen within anterior lower left neck and left subclavian presumed venous structures as well as anterior aspect of the pulmonary trunk.    Left thyroid lobe is not identified and likely surgically absent. Subcentimeter low-attenuation parenchymal focus within the anterior right thyroid lobe.    Extrathoracic soft tissues are within normal limits.    There is a left-sided arch. Minimal scattered atherosclerosis. The aorta is non-aneurysmal. Heart is normal in size without significant pericardial fluid. Mild coronary atherosclerosis. Minimal aortic valvular calcification. Esophagus is normal in course and caliber. No mediastinal, hilar or axillary lymphadenopathy. Pulmonary trunk is upper limits of normal in caliber. No pneumomediastinum.    The lungs demonstrate scattered areas of platelike scarring versus atelectasis primarily within the right lung. Mild dependent atelectasis, right or than left. There is mild architectural distortion with reticulation seen along the posterior lateral aspect of the right lung. No large consolidation, pleural effusion or pneumothorax. No suspicious pulmonary nodules. There is bilateral mild bronchiectasis, slightly more prominent on the right. Mild subpleural cystic change noted bilaterally.    Included upper abdomen is within normal limits. No acute displaced fracture, dislocation or destructive osseous process. Age-related degenerative change.                             CT Soft Tissue Neck WO Contrast (Final result)  Result time 01/21/18 16:35:49    Final result by Christopher Rapp MD (01/21/18 16:35:49)                 Impression:        1.  Redemonstration of scattered gas foci throughout the superficial and also deep venous structures of the imaged extracranial soft tissues, bilateral  spaces, bilateral cavernous sinuses and upper neck, anterior lower left neck, left subclavian vein and pulmonary trunk. Given the slight predominance on the left and also seen within the pulmonary trunk and left subclavian vein without identifiable cause such as sphenoid fracture or pneumomediastinum, this may be related to a current or recent indwelling venous catheter. Correlate clinically.    2. Otherwise, no acute neck soft tissue or chest abnormality allowing for lack of intravenous contrast.    3. Absent left thyroid lobe which may be related to prior surgery. Right thyroid subcentimeter low attenuation focus which may represent a nodule. Further evaluation with elective/nonemergent thyroid ultrasound can be obtained as warranted.    4. Additional findings as above.      Electronically signed by: JULIA REED MD, MD  Date:     01/21/18  Time:    16:35              Narrative:    Technique:  Axial images were obtained through the cervical soft tissues and chest without intravenous contrast. Coronal and sagittal reformatted images were generated.    Comparison: Head CT and chest radiograph earlier same day    Findings: Please note that the lack of intravenous contrast limits evaluation of soft tissue and vascular structures.    CT soft tissue neck:  Once again, scattered gas foci throughout the superficial and also deep presumed venous structures of the imaged extracranial soft tissues, bilateral  spaces, bilateral cavernous sinuses and upper neck. Slightly more predominant on the left.    There is mucosal thickening within the left maxillary sinus.  No air fluid levels within the sinuses.  The remaining visualized paranasal sinuses and mastoid air cells are clear.    The orbits and orbital contents appear within normal limits.  The globes are  symmetric and the ocular lenses are anteriorly located.  No acute fracture or dislocation.  There is mild leftward deviation of the nasal septum .  Bilateral mandibular condyles and TMJs appear relatively symmetric and intact.  The maxilla is edentulous. Several dental caries noted of the remaining anterior mandibular teeth.    The base of the tongue appears within normal limits.  No peritonsillar, parapharyngeal or retropharyngeal discrete mass or fluid collections identified, allowing for lack of intravenous contrast.  The airway is relatively midline and overall patent.  Epiglottis and uvula appear normal. The bilateral valleculae and piriform sinuses appear within normal limits.  The aryepiglottic folds are within normal limits.  The true and false vocal cords are grossly within normal limits.  No prevertebral soft tissue swelling or paraspinal hematoma.    The bilateral parotid, submandibular, and sublingual glands are normal in appearance.  There are scattered normal sized lymph nodes noted throughout the cervical region and submental and submandibular regions, but not enlarged by CT criteria.   No subcutaneous emphysema or radiodense foreign body.    Surgical changes of ACDF at C3-C6 levels without evidence of hardware failure or loosening. Multilevel mild to moderate degenerative change of the cervical spine. No acute displaced fracture or dislocation. Bilateral inferior frontal lobe and right parieto-occipital watershed zone encephalomalacia partially imaged. No acute findings seen within the included intracranial contents.      CT chest: A few gas foci seen within anterior lower left neck and left subclavian presumed venous structures as well as anterior aspect of the pulmonary trunk.    Left thyroid lobe is not identified and likely surgically absent. Subcentimeter low-attenuation parenchymal focus within the anterior right thyroid lobe.    Extrathoracic soft tissues are within normal limits.    There is  a left-sided arch. Minimal scattered atherosclerosis. The aorta is non-aneurysmal. Heart is normal in size without significant pericardial fluid. Mild coronary atherosclerosis. Minimal aortic valvular calcification. Esophagus is normal in course and caliber. No mediastinal, hilar or axillary lymphadenopathy. Pulmonary trunk is upper limits of normal in caliber. No pneumomediastinum.    The lungs demonstrate scattered areas of platelike scarring versus atelectasis primarily within the right lung. Mild dependent atelectasis, right or than left. There is mild architectural distortion with reticulation seen along the posterior lateral aspect of the right lung. No large consolidation, pleural effusion or pneumothorax. No suspicious pulmonary nodules. There is bilateral mild bronchiectasis, slightly more prominent on the right. Mild subpleural cystic change noted bilaterally.    Included upper abdomen is within normal limits. No acute displaced fracture, dislocation or destructive osseous process. Age-related degenerative change.                             X-Ray Chest AP Portable (Final result)  Result time 01/21/18 14:41:45    Final result by Marshall Harley MD (01/21/18 14:41:45)                 Impression:     No acute cardiopulmonary process.      Electronically signed by: MARSHALL HARLEY MD  Date:     01/21/18  Time:    14:41              Narrative:    Chest x-ray, 1 view    Comparison: None.    Findings: ACDF hardware noted. There is no consolidation, effusion, or pneumothorax.  Cardiomediastinal silhouette is unremarkable.                             CT Head Without Contrast (Final result)  Result time 01/21/18 14:47:37    Final result by Christopher Rapp MD (01/21/18 14:47:37)                 Impression:        1.  No acute intracranial findings identified. Specifically, no intracranial hemorrhage.    2.  Bilateral frontal lobe and right parieto-occipital watershed zone encephalomalacia suggesting sequela of remote  infarct and/or trauma.    3. Senescent changes as above.    4.  Diffuse gas foci throughout the presumed venous system of the extracranial soft tissues and also bilateral cavernous sinuses, presumably related to air introduced via an indwelling catheter. Correlate clinically.        Electronically signed by: JULIA REED MD, MD  Date:     01/21/18  Time:    14:47              Narrative:    COMPARISON: None    FINDINGS: Inferior bilateral frontal lobe encephalomalacia, left greater than right and right parietal occipital watershed zone encephalomalacia. 5 mm coarse calcification within the right parietal watershed zone region of encephalomalacia, nonspecific. There is compensatory dilatation of the bilateral frontal horns and right posterior horn of the lateral ventricles. The ventricles are otherwise midline without distortion by mass effect.    No convincing evidence of hemorrhage, mass, midline shift or acute major vascular territory infarct.  There is superimposed age appropriate  generalized cerebral atrophy.  There are patchy and confluent low attenuation changes throughout the subcortical and periventricular white matter, nonspecific but can be seen with chronic small vessel ischemic disease.  No extra-axial hemorrhage or mass. Skull base  vascular calcifications are noted.    Diffuse gas foci throughout the presumed venous system of the extracranial soft tissues and also bilateral cavernous sinuses.     The extracranial structures are otherwise within normal limits.  Calvarium is intact.  Visualized paranasal sinuses are clear.  Mastoid air cells are clear.                              Pending Diagnostic Studies:     None         Medications:  Reconciled Home Medications:   Current Discharge Medication List      CONTINUE these medications which have NOT CHANGED    Details   aspirin (ECOTRIN) 81 MG EC tablet Take 81 mg by mouth once daily.      cyproheptadine (PERIACTIN) 4 mg tablet Take 4 mg by mouth 3  (three) times daily as needed.      divalproex ER (DEPAKOTE) 500 MG Tb24 Take 500 mg by mouth once daily.      escitalopram oxalate (LEXAPRO) 10 MG tablet Take 10 mg by mouth once daily.      ferrous gluconate (FERGON) 240 (27 FE) MG tablet Take 240 mg by mouth 2 (two) times daily.      gabapentin (NEURONTIN) 300 MG capsule Take 300 mg by mouth 3 (three) times daily. Take 1 capsule PO in the am & 2 capsules in the evening      hydrocodone-acetaminophen 7.5-325mg (NORCO) 7.5-325 mg per tablet Take 1 tablet by mouth every 6 (six) hours as needed for Pain.      midodrine (PROAMATINE) 2.5 MG Tab Take 2.5 mg by mouth 3 (three) times daily.      pantoprazole (PROTONIX) 40 MG tablet Take 40 mg by mouth once daily.      rosuvastatin (CRESTOR) 40 MG Tab Take 10 mg by mouth every evening.      tamsulosin (FLOMAX) 0.4 mg Cp24 Take 0.4 mg by mouth once daily.         STOP taking these medications       metFORMIN (GLUCOPHAGE) 1000 MG tablet Comments:   Reason for Stopping:               Indwelling Lines/Drains at time of discharge:   Lines/Drains/Airways          No matching active lines, drains, or airways          Time spent on the discharge of patient: 25 minutes  Patient was seen and examined on the date of discharge and determined to be suitable for discharge.         Ernesto Severino MD  Department of Hospital Medicine  [facility]

## 2018-01-24 NOTE — PT/OT/SLP DISCHARGE
Occupational Therapy Discharge Summary    Campbell Costa  MRN: [id number]   Principal Problem: Hypoglycemia      Patient Discharged from acute Occupational Therapy on 1/24/18.  Please refer to prior OT note dated 1/23/18 for functional status.    Assessment:      Patient appropriate for care in another setting.    Objective:     GOALS:    Occupational Therapy Goals     Not on file          Multidisciplinary Problems (Resolved)        Problem: Occupational Therapy Goal    Goal Priority Disciplines Outcome Interventions   Occupational Therapy Goal   (Resolved)     OT, PT/OT Outcome(s) achieved    Description:  Goals to be met by: 1/28/2018    Patient will increase functional independence with ADLs by performing:    UE Dressing with Supervision.  LE Dressing with Supervision and Assistive Devices as needed.  Grooming while standing at sink with Supervision.  Toileting from toilet with Supervision for hygiene and clothing management.   Stand pivot transfers with Supervision.  Toilet transfer to toilet with Supervision.                      Reasons for Discontinuation of Therapy Services  Transfer to alternate level of care.      Plan:     Patient Discharged to: Home with Home Health Service    Matthew Doe, OT  1/24/2018

## 2018-01-24 NOTE — NURSING
Pt blood glucose within normal limits throughout shift.  Pt free from trauma, injury, and falls throughout.  Removed IV, reviewed discharge instructions; pt verbalized understanding.  Gerri from case management will get glucometer sent to [facility] on [address] [employer] by Dr. Severino.  Called house supervisor for cab voucher.

## 2018-01-24 NOTE — ASSESSMENT & PLAN NOTE
- Patient had multiple bouts of hypoglycemia in ER requiring intervention  - BG monitored Q4  - DC'd D5 IVFs and blood glucose was stable and not decreasing.    - Discussed with patient to follow up quickly with PCP as metformin may likely be restarted.

## 2018-01-24 NOTE — PROGRESS NOTES
[facility] Hospital Medicine  Progress Note    Patient Name: Campbell Costa  MRN: [id number]  Patient Class: IP- Inpatient   Admission Date: 1/21/2018  Length of Stay: 2 days  Attending Physician: Ernesto Severino MD  Primary Care Provider: Jim Croft MD        Subjective:     Principal Problem:Hypoglycemia    HPI:  This is a 67 y.o. male with a history of COPD, Dm, HTN, seizures, and stroke who presents with complaint of fatigue that began today. Patient reports wife found him on the floor. Patient is unsure of how he got there. Wife reports patient was confused after the episode. While he was on the floor, wife reports patient had low blood pressure (75/48). Wife reports patient seems less responsive that usual. He denies any fever, chills, congestion, cough, chest pain, nausea, vomiting, or dysuria. Wife reports giving the patient morning medications and a muscle relaxer for back pain prior to the episode. Patient reports keppra was increased about three weeks ago.        Hospital Course:  No notes on file    Interval History:   Doing okay.  Tolerating PO.      Review of Systems   Constitutional: Negative for appetite change and fever.   Respiratory: Negative for cough and shortness of breath.    Cardiovascular: Negative for chest pain and palpitations.   Gastrointestinal: Negative for nausea and vomiting.   Skin: Negative for color change and rash.   Neurological: Negative for dizziness and weakness.   Psychiatric/Behavioral: Negative for agitation and confusion.     Objective:     Vital Signs (Most Recent):  Temp: 97.4 °F (36.3 °C) (01/24/18 0730)  Pulse: 65 (01/24/18 0730)  Resp: 18 (01/24/18 0730)  BP: 132/80 (01/24/18 0730)  SpO2: 97 % (01/24/18 0730) Vital Signs (24h Range):  Temp:  [97.4 °F (36.3 °C)-98.3 °F (36.8 °C)] 97.4 °F (36.3 °C)  Pulse:  [65-80] 65  Resp:  [18-20] 18  SpO2:  [96 %-99 %] 97 %  BP: (106-147)/(65-84) 132/80     Weight: 75.8 kg (167 lb 1.7 oz)  Body mass index is 25.41  kg/m².    Intake/Output Summary (Last 24 hours) at 01/24/18 1033  Last data filed at 01/24/18 0500   Gross per 24 hour   Intake              600 ml   Output              600 ml   Net                0 ml      Physical Exam   Constitutional: He is oriented to person, place, and time. He appears well-developed and well-nourished.   HENT:   Head: Normocephalic and atraumatic.   Eyes: Conjunctivae are normal. Pupils are equal, round, and reactive to light.   Neck: Normal range of motion. Neck supple.   Cardiovascular: Normal rate and regular rhythm.    Pulmonary/Chest: Effort normal and breath sounds normal.   Abdominal: Soft.   Musculoskeletal:   Fingers missing right hand, has thumb   Neurological: He is alert and oriented to person, place, and time.   Skin: Skin is warm and dry.       Significant Labs:   BMP:   Recent Labs  Lab 01/24/18  0458   GLU 95      K 4.8      CO2 27   BUN 10   CREATININE 0.7   CALCIUM 8.9   MG 1.7     CBC:   Recent Labs  Lab 01/23/18  0421 01/24/18  0458   WBC 5.24 5.35   HGB 8.7* 8.9*   HCT 27.5* 28.0*   * 170       Significant Imaging: I have reviewed all pertinent imaging results/findings within the past 24 hours.   Imaging Results          CT Chest Without Contrast (Final result)  Result time 01/21/18 16:35:49    Final result by Christopher Rapp MD (01/21/18 16:35:49)                 Impression:        1. Redemonstration of scattered gas foci throughout the superficial and also deep venous structures of the imaged extracranial soft tissues, bilateral  spaces, bilateral cavernous sinuses and upper neck, anterior lower left neck, left subclavian vein and pulmonary trunk. Given the slight predominance on the left and also seen within the pulmonary trunk and left subclavian vein without identifiable cause such as sphenoid fracture or pneumomediastinum, this may be related to a current or recent indwelling venous catheter. Correlate clinically.    2. Otherwise, no  acute neck soft tissue or chest abnormality allowing for lack of intravenous contrast.    3. Absent left thyroid lobe which may be related to prior surgery. Right thyroid subcentimeter low attenuation focus which may represent a nodule. Further evaluation with elective/nonemergent thyroid ultrasound can be obtained as warranted.    4. Additional findings as above.      Electronically signed by: JULIA REED MD, MD  Date:     01/21/18  Time:    16:35              Narrative:    Technique:  Axial images were obtained through the cervical soft tissues and chest without intravenous contrast. Coronal and sagittal reformatted images were generated.    Comparison: Head CT and chest radiograph earlier same day    Findings: Please note that the lack of intravenous contrast limits evaluation of soft tissue and vascular structures.    CT soft tissue neck:  Once again, scattered gas foci throughout the superficial and also deep presumed venous structures of the imaged extracranial soft tissues, bilateral  spaces, bilateral cavernous sinuses and upper neck. Slightly more predominant on the left.    There is mucosal thickening within the left maxillary sinus.  No air fluid levels within the sinuses.  The remaining visualized paranasal sinuses and mastoid air cells are clear.    The orbits and orbital contents appear within normal limits.  The globes are symmetric and the ocular lenses are anteriorly located.  No acute fracture or dislocation.  There is mild leftward deviation of the nasal septum .  Bilateral mandibular condyles and TMJs appear relatively symmetric and intact.  The maxilla is edentulous. Several dental caries noted of the remaining anterior mandibular teeth.    The base of the tongue appears within normal limits.  No peritonsillar, parapharyngeal or retropharyngeal discrete mass or fluid collections identified, allowing for lack of intravenous contrast.  The airway is relatively midline and overall  patent.  Epiglottis and uvula appear normal. The bilateral valleculae and piriform sinuses appear within normal limits.  The aryepiglottic folds are within normal limits.  The true and false vocal cords are grossly within normal limits.  No prevertebral soft tissue swelling or paraspinal hematoma.    The bilateral parotid, submandibular, and sublingual glands are normal in appearance.  There are scattered normal sized lymph nodes noted throughout the cervical region and submental and submandibular regions, but not enlarged by CT criteria.   No subcutaneous emphysema or radiodense foreign body.    Surgical changes of ACDF at C3-C6 levels without evidence of hardware failure or loosening. Multilevel mild to moderate degenerative change of the cervical spine. No acute displaced fracture or dislocation. Bilateral inferior frontal lobe and right parieto-occipital watershed zone encephalomalacia partially imaged. No acute findings seen within the included intracranial contents.      CT chest: A few gas foci seen within anterior lower left neck and left subclavian presumed venous structures as well as anterior aspect of the pulmonary trunk.    Left thyroid lobe is not identified and likely surgically absent. Subcentimeter low-attenuation parenchymal focus within the anterior right thyroid lobe.    Extrathoracic soft tissues are within normal limits.    There is a left-sided arch. Minimal scattered atherosclerosis. The aorta is non-aneurysmal. Heart is normal in size without significant pericardial fluid. Mild coronary atherosclerosis. Minimal aortic valvular calcification. Esophagus is normal in course and caliber. No mediastinal, hilar or axillary lymphadenopathy. Pulmonary trunk is upper limits of normal in caliber. No pneumomediastinum.    The lungs demonstrate scattered areas of platelike scarring versus atelectasis primarily within the right lung. Mild dependent atelectasis, right or than left. There is mild  architectural distortion with reticulation seen along the posterior lateral aspect of the right lung. No large consolidation, pleural effusion or pneumothorax. No suspicious pulmonary nodules. There is bilateral mild bronchiectasis, slightly more prominent on the right. Mild subpleural cystic change noted bilaterally.    Included upper abdomen is within normal limits. No acute displaced fracture, dislocation or destructive osseous process. Age-related degenerative change.                             CT Soft Tissue Neck WO Contrast (Final result)  Result time 01/21/18 16:35:49    Final result by Julia Rapp MD (01/21/18 16:35:49)                 Impression:        1. Redemonstration of scattered gas foci throughout the superficial and also deep venous structures of the imaged extracranial soft tissues, bilateral  spaces, bilateral cavernous sinuses and upper neck, anterior lower left neck, left subclavian vein and pulmonary trunk. Given the slight predominance on the left and also seen within the pulmonary trunk and left subclavian vein without identifiable cause such as sphenoid fracture or pneumomediastinum, this may be related to a current or recent indwelling venous catheter. Correlate clinically.    2. Otherwise, no acute neck soft tissue or chest abnormality allowing for lack of intravenous contrast.    3. Absent left thyroid lobe which may be related to prior surgery. Right thyroid subcentimeter low attenuation focus which may represent a nodule. Further evaluation with elective/nonemergent thyroid ultrasound can be obtained as warranted.    4. Additional findings as above.      Electronically signed by: JULIA RAPP MD, MD  Date:     01/21/18  Time:    16:35              Narrative:    Technique:  Axial images were obtained through the cervical soft tissues and chest without intravenous contrast. Coronal and sagittal reformatted images were generated.    Comparison: Head CT and chest radiograph  earlier same day    Findings: Please note that the lack of intravenous contrast limits evaluation of soft tissue and vascular structures.    CT soft tissue neck:  Once again, scattered gas foci throughout the superficial and also deep presumed venous structures of the imaged extracranial soft tissues, bilateral  spaces, bilateral cavernous sinuses and upper neck. Slightly more predominant on the left.    There is mucosal thickening within the left maxillary sinus.  No air fluid levels within the sinuses.  The remaining visualized paranasal sinuses and mastoid air cells are clear.    The orbits and orbital contents appear within normal limits.  The globes are symmetric and the ocular lenses are anteriorly located.  No acute fracture or dislocation.  There is mild leftward deviation of the nasal septum .  Bilateral mandibular condyles and TMJs appear relatively symmetric and intact.  The maxilla is edentulous. Several dental caries noted of the remaining anterior mandibular teeth.    The base of the tongue appears within normal limits.  No peritonsillar, parapharyngeal or retropharyngeal discrete mass or fluid collections identified, allowing for lack of intravenous contrast.  The airway is relatively midline and overall patent.  Epiglottis and uvula appear normal. The bilateral valleculae and piriform sinuses appear within normal limits.  The aryepiglottic folds are within normal limits.  The true and false vocal cords are grossly within normal limits.  No prevertebral soft tissue swelling or paraspinal hematoma.    The bilateral parotid, submandibular, and sublingual glands are normal in appearance.  There are scattered normal sized lymph nodes noted throughout the cervical region and submental and submandibular regions, but not enlarged by CT criteria.   No subcutaneous emphysema or radiodense foreign body.    Surgical changes of ACDF at C3-C6 levels without evidence of hardware failure or loosening.  Multilevel mild to moderate degenerative change of the cervical spine. No acute displaced fracture or dislocation. Bilateral inferior frontal lobe and right parieto-occipital watershed zone encephalomalacia partially imaged. No acute findings seen within the included intracranial contents.      CT chest: A few gas foci seen within anterior lower left neck and left subclavian presumed venous structures as well as anterior aspect of the pulmonary trunk.    Left thyroid lobe is not identified and likely surgically absent. Subcentimeter low-attenuation parenchymal focus within the anterior right thyroid lobe.    Extrathoracic soft tissues are within normal limits.    There is a left-sided arch. Minimal scattered atherosclerosis. The aorta is non-aneurysmal. Heart is normal in size without significant pericardial fluid. Mild coronary atherosclerosis. Minimal aortic valvular calcification. Esophagus is normal in course and caliber. No mediastinal, hilar or axillary lymphadenopathy. Pulmonary trunk is upper limits of normal in caliber. No pneumomediastinum.    The lungs demonstrate scattered areas of platelike scarring versus atelectasis primarily within the right lung. Mild dependent atelectasis, right or than left. There is mild architectural distortion with reticulation seen along the posterior lateral aspect of the right lung. No large consolidation, pleural effusion or pneumothorax. No suspicious pulmonary nodules. There is bilateral mild bronchiectasis, slightly more prominent on the right. Mild subpleural cystic change noted bilaterally.    Included upper abdomen is within normal limits. No acute displaced fracture, dislocation or destructive osseous process. Age-related degenerative change.                             X-Ray Chest AP Portable (Final result)  Result time 01/21/18 14:41:45    Final result by Shen Harley MD (01/21/18 14:41:45)                 Impression:     No acute cardiopulmonary  process.      Electronically signed by: MARSHALL CARRERO MD  Date:     01/21/18  Time:    14:41              Narrative:    Chest x-ray, 1 view    Comparison: None.    Findings: ACDF hardware noted. There is no consolidation, effusion, or pneumothorax.  Cardiomediastinal silhouette is unremarkable.                             CT Head Without Contrast (Final result)  Result time 01/21/18 14:47:37    Final result by Julia Rapp MD (01/21/18 14:47:37)                 Impression:        1.  No acute intracranial findings identified. Specifically, no intracranial hemorrhage.    2.  Bilateral frontal lobe and right parieto-occipital watershed zone encephalomalacia suggesting sequela of remote infarct and/or trauma.    3. Senescent changes as above.    4.  Diffuse gas foci throughout the presumed venous system of the extracranial soft tissues and also bilateral cavernous sinuses, presumably related to air introduced via an indwelling catheter. Correlate clinically.        Electronically signed by: JULIA RAPP MD, MD  Date:     01/21/18  Time:    14:47              Narrative:    COMPARISON: None    FINDINGS: Inferior bilateral frontal lobe encephalomalacia, left greater than right and right parietal occipital watershed zone encephalomalacia. 5 mm coarse calcification within the right parietal watershed zone region of encephalomalacia, nonspecific. There is compensatory dilatation of the bilateral frontal horns and right posterior horn of the lateral ventricles. The ventricles are otherwise midline without distortion by mass effect.    No convincing evidence of hemorrhage, mass, midline shift or acute major vascular territory infarct.  There is superimposed age appropriate  generalized cerebral atrophy.  There are patchy and confluent low attenuation changes throughout the subcortical and periventricular white matter, nonspecific but can be seen with chronic small vessel ischemic disease.  No extra-axial hemorrhage or mass.  Skull base  vascular calcifications are noted.    Diffuse gas foci throughout the presumed venous system of the extracranial soft tissues and also bilateral cavernous sinuses.     The extracranial structures are otherwise within normal limits.  Calvarium is intact.  Visualized paranasal sinuses are clear.  Mastoid air cells are clear.                            Assessment/Plan:      * Hypoglycemia    - Patient had multiple bouts of hypoglycemia in ER requiring intervention  - BG Q4  - DC IVFs and evaluate for blood glucose stability          Iron deficiency anemia    - H/H- 9/28.6.  Don't believe this is the reason for being found unresponsive at home  - Fe, B-12, folate   - Continue home iron supplement  - Follow up with PCP.             Subcutaneous air, initial encounter    - CT- Redemonstration of scattered gas foci throughout the superficial and also deep venous structures of the imaged extracranial soft tissues, bilateral  spaces, bilateral cavernous sinuses and upper neck, anterior lower left neck, left subclavian vein and pulmonary trunk. Given the slight predominance on the left and also seen within the pulmonary trunk and left subclavian vein without identifiable cause such as sphenoid fracture or pneumomediastinum, this may be related to a current or recent indwelling venous catheter. Correlate clinically.  - Recent thyroid surgery approximately 1 month ago  - Will monitor          Type 2 diabetes mellitus    - A1c = 6.1  - On Metformin at home. Will hold and provide SSI        Hyperlipidemia    - Continue Crestor    Results for JOEY FANG (MRN [id number]) as of 1/23/2018 10:02   Ref. Range 1/22/2018 04:32   Cholesterol Latest Ref Range: 120 - 199 mg/dL 128   HDL Latest Ref Range: 40 - 75 mg/dL 32 (L)   LDL Cholesterol Latest Ref Range: 63.0 - 159.0 mg/dL 81.8   Total Cholesterol/HDL Ratio Latest Ref Range: 2.0 - 5.0  4.0   Triglycerides Latest Ref Range: 30 - 150 mg/dL 71           Seizure  disorder    - Valproic level low (49). Could be non compliance. Concern for seizure being causative for being found down at home  - Continue Depakote at home dose   - Neurology noted:  Continue present dosage of Depakote.  Will not change the Depakote dosage as it is below therapeutic and is present seizure may have been precipitated by the hypoglycemic episode.  He has an appointment to see his neurologist at [facility] on February 9, at which time his medication dosage will be reviewed.  Discussed diagnosis with patient's spouse.            VTE Risk Mitigation         Ordered     Medium Risk of VTE  Once      01/21/18 2115     enoxaparin injection 40 mg  Daily     Route:  Subcutaneous        01/21/18 2115     Place RICHA hose  Until discontinued      01/21/18 2115     Place sequential compression device  Until discontinued      01/21/18 2115              Ernesto Severino MD  Department of Hospital Medicine   [facility]

## 2018-01-24 NOTE — ASSESSMENT & PLAN NOTE
- Patient had multiple bouts of hypoglycemia in ER requiring intervention  - BG Q4  - DC IVFs and evaluate for blood glucose stability

## 2018-01-24 NOTE — ASSESSMENT & PLAN NOTE
- Continue Crestor    Results for JOEY FANG (MRN [id number]) as of 1/23/2018 10:02   Ref. Range 1/22/2018 04:32   Cholesterol Latest Ref Range: 120 - 199 mg/dL 128   HDL Latest Ref Range: 40 - 75 mg/dL 32 (L)   LDL Cholesterol Latest Ref Range: 63.0 - 159.0 mg/dL 81.8   Total Cholesterol/HDL Ratio Latest Ref Range: 2.0 - 5.0  4.0   Triglycerides Latest Ref Range: 30 - 150 mg/dL 71

## 2018-01-24 NOTE — ASSESSMENT & PLAN NOTE
- Valproic level low (49). Could be non compliance. Concern for seizure being causative for being found down at home  - Continue Depakote at home dose   - Neurology noted:  Continue present dosage of Depakote.  Will not change the Depakote dosage as it is below therapeutic and is present seizure may have been precipitated by the hypoglycemic episode.  He has an appointment to see his neurologist at [facility] on February 9, at which time his medication dosage will be reviewed.  Discussed diagnosis with patient's spouse.

## 2018-01-24 NOTE — PLAN OF CARE
Pt discharging home today. Pt to have HH with [facility], family to transport home. DME order for rollator sent to [facility]. CM informed spouse, Emilie, [phone], that we are unable to fill the glucometer here. CM will ask MD to send order to [facility] on [address]. No further CM orders for discharge. Family to transport home.     01/24/18 1432   Final Note   Assessment Type Final Discharge Note   Discharge Disposition Home-Health   What phone number can be called within the next 1-3 days to see how you are doing after discharge? [phone]   Hospital Follow Up  Appt(s) scheduled? Yes   Discharge plans and expectations educations in teach back method with documentation complete? Yes   Right Care Referral Info   Referral Type Anne-Marie

## 2018-01-24 NOTE — PLAN OF CARE
[facility]    HOME HEALTH ORDERS  FACE TO FACE ENCOUNTER    Patient Name: Campbell Costa  YOB: 1950    PCP: Jim Croft MD   PCP Address: [address] [place] [address]  PCP Phone Number: [phone]  PCP Fax: [phone]    Encounter Date: 01/24/2018    Admit to Home Health    Diagnoses:  Active Hospital Problems    Diagnosis  POA    *Hypoglycemia [E16.2]  Yes    Seizure disorder [G40.909]  Yes    Hyperlipidemia [E78.5]  Yes    Type 2 diabetes mellitus [E11.9]  Yes    Subcutaneous air, initial encounter [T79.7XXA]  Yes    Iron deficiency anemia [D50.9]  Yes      Resolved Hospital Problems    Diagnosis Date Resolved POA    Hypomagnesemia [E83.42] 01/24/2018 Yes    Hypotension [I95.9] 01/24/2018 Yes       No future appointments.        I have seen and examined this patient face to face today. My clinical findings that support the need for the home health skilled services and home bound status are the following:  Weakness/numbness causing balance and gait disturbance due to Weakness/Debility and Anemia making it taxing to leave home.    Allergies:  Review of patient's allergies indicates:   Allergen Reactions    Lisinopril        Diet: diabetic diet: 1800 calorie    Activities: activity as tolerated    Nursing:   SN to complete comprehensive assessment including routine vital signs. Instruct on disease process and s/s of complications to report to MD. Review/verify medication list sent home with the patient at time of discharge  and instruct patient/caregiver as needed. Frequency may be adjusted depending on start of care date.    Notify MD if SBP > 160 or < 90; DBP > 90 or < 50; HR > 120 or < 50; Temp > 101;       CONSULTS:    Physical Therapy to evaluate and treat. Evaluate for home safety and equipment needs; Establish/upgrade home exercise program. Perform / instruct on therapeutic exercises, gait training, transfer training, and Range of Motion.  Occupational  Therapy to evaluate and treat. Evaluate home environment for safety and equipment needs. Perform/Instruct on transfers, ADL training, ROM, and therapeutic exercises.  Aide to provide assistance with personal care, ADLs, and vital signs.    MISCELLANEOUS CARE:  N/A    WOUND CARE ORDERS  n/a      Medications: Review discharge medications with patient and family and provide education.       Campbell Costa   Home Medication Instructions RUPESH:[id number]    Printed on:01/24/18 7291   Medication Information                      aspirin (ECOTRIN) 81 MG EC tablet  Take 81 mg by mouth once daily.             cyproheptadine (PERIACTIN) 4 mg tablet  Take 4 mg by mouth 3 (three) times daily as needed.             divalproex ER (DEPAKOTE) 500 MG Tb24  Take 500 mg by mouth once daily.             escitalopram oxalate (LEXAPRO) 10 MG tablet  Take 10 mg by mouth once daily.             ferrous gluconate (FERGON) 240 (27 FE) MG tablet  Take 240 mg by mouth 2 (two) times daily.             gabapentin (NEURONTIN) 300 MG capsule  Take 300 mg by mouth 3 (three) times daily. Take 1 capsule PO in the am & 2 capsules in the evening             hydrocodone-acetaminophen 7.5-325mg (NORCO) 7.5-325 mg per tablet  Take 1 tablet by mouth every 6 (six) hours as needed for Pain.             midodrine (PROAMATINE) 2.5 MG Tab  Take 2.5 mg by mouth 3 (three) times daily.             pantoprazole (PROTONIX) 40 MG tablet  Take 40 mg by mouth once daily.             rosuvastatin (CRESTOR) 40 MG Tab  Take 10 mg by mouth every evening.             tamsulosin (FLOMAX) 0.4 mg Cp24  Take 0.4 mg by mouth once daily.                   I certify that this patient is confined to his home and needs intermittent skilled nursing care, physical therapy and occupational therapy.

## 2018-01-24 NOTE — PLAN OF CARE
Problem: Patient Care Overview  Goal: Plan of Care Review  Outcome: Ongoing (interventions implemented as appropriate)  Vs stable. Voiding clear yellow urine.Slept well. Safety measures maintained.

## 2018-01-24 NOTE — ASSESSMENT & PLAN NOTE
- H/H- 9/28.6.  Don't believe this is the reason for being found unresponsive at home  - B-12, folate WNL.   - Continue home iron supplement  - Follow up with PCP.

## 2018-01-24 NOTE — PT/OT/SLP DISCHARGE
Physical Therapy Discharge Summary    Name: Campbell Costa  MRN: [id number]   Principal Problem: Hypoglycemia     Patient Discharged from acute Physical Therapy on 2018 .  Please refer to prior PT noted date on 2018  for functional status.     Assessment:     Patient has not met goals.    Objective:     GOALS:    Physical Therapy Goals     Not on file          Multidisciplinary Problems (Resolved)        Problem: Physical Therapy Goal    Goal Priority Disciplines Outcome Goal Variances Interventions   Physical Therapy Goal   (Resolved)     PT/OT, PT Outcome(s) achieved     Description:  Goals to be met by: 18     Patient will increase functional independence with mobility by performin. Supine to sit with S  2. Sit to supine with S  3. Sit to stand transfer with S  4. Bed to chair transfer with S   5. Gait  x 150 feet with Supervision using rollator.   6. Ascend/descend 2 stair with left Handrails Minimal Assistance   7. Ascend/Descend 4 inch curb step with Minimal Assistance using rollator.                      Reasons for Discontinuation of Therapy Services  Transfer to alternate level of care.      Plan:     Patient Discharged to: Home with Home Health Service.    Irene Marcos, PT  2018   PT of record not available for documentation.

## 2018-01-24 NOTE — ASSESSMENT & PLAN NOTE
- CT- Redemonstration of scattered gas foci throughout the superficial and also deep venous structures of the imaged extracranial soft tissues, bilateral  spaces, bilateral cavernous sinuses and upper neck, anterior lower left neck, left subclavian vein and pulmonary trunk. Given the slight predominance on the left and also seen within the pulmonary trunk and left subclavian vein without identifiable cause such as sphenoid fracture or pneumomediastinum, this may be related to a current or recent indwelling venous catheter. Correlate clinically.  - Recent thyroid surgery approximately 1 month ago  - No issue clinically.

## 2018-01-25 ENCOUNTER — PATIENT OUTREACH (OUTPATIENT)
Dept: ADMINISTRATIVE | Facility: CLINIC | Age: 68
End: 2018-01-25

## 2018-01-25 RX ORDER — DIVALPROEX SODIUM 500 MG/1
1000 TABLET, DELAYED RELEASE ORAL NIGHTLY
COMMUNITY

## 2018-01-25 RX ORDER — UBIDECARENONE 75 MG
500 CAPSULE ORAL 2 TIMES DAILY
COMMUNITY

## 2018-01-25 RX ORDER — CHOLECALCIFEROL (VITAMIN D3) 25 MCG
1000 TABLET ORAL DAILY
COMMUNITY

## 2018-01-25 NOTE — PROGRESS NOTES
Please forward this important TCC information to your provider in order to maximize the post discharge care delivery of this patient.    C3 nurse spoke with the wife of Campbell Costa  for a TCC post hospital discharge follow up call. The patient has a scheduled HOSFU appointment with Jim Croft MD on 1\29\18 @ 8091.  Respectfully,  Chely Lantigua RN  [facility]    [email or web address]       Please do not reply to this message, as this inbox is not routinely monitored.

## 2018-01-25 NOTE — PATIENT INSTRUCTIONS
Hypoglycemia, Oral Diabetic Medicine  You have been treated for low blood sugar (hypoglycemia) caused by your diabetes medicine. Oral diabetes medicines include:  · Glyburide  · Glipizide  · Acarbose  · Metformin  · Pioglitazone  · Sitagliptin  · Canigliflozin  · Colesevelam  Low blood sugar can occur when you continue to take your usual dose of diabetes medicine but you skip meals or dont eat the amount of food that your body is used to. It can also result from taking too much diabetes medicine.  Other factors that can lower blood sugar while taking diabetes medicine include intense exercise, strong emotions, alcohol use, tobacco, caffeine, and certain medicines. These medicines include:  · Aspirin  · Haloperidol  · Propoxyphene  · Chlorpromazine  · Propranolol  · Disopyramide  · Lisinopril (and other angiotensin-converting enzyme inhibitors)  In addition, some over-the-counter cough and cold products contain alcohol, which can affect your blood sugar levels.  A class of medicines called beta-blockers is used for high blood pressure, rapid heart rate, and other conditions. Beta-blockers may prevent the early symptoms of low blood sugar. If you are taking a beta-blocker, you might not realize that your blood sugar is getting low. If you take a beta-blocker, talk to your healthcare provider about switching to a different class. The beta-blocker class includes:  · Propranolol  · Atenolol  · Metoprolol  · Nadolol  · Labetalol  · Carvedilol  Home care  · During the next 24 hours rest and eat frequent small meals. This will help prevent the return of low blood sugar.  · Learn the signals your body gives as your blood sugar drops (see below).  If symptoms of hypoglycemia return  · Keep a source of fast-acting sugar with you. At the first sign of low blood sugar, eat or drink 15 to 20 grams of fast-acting sugar. Examples include:  ¨ 3 to 4 glucose tablets (found at most drugstores)  ¨ 4 ounces of regular soda  ¨ 4  ounces of fruit juice  ¨ 2 tablespoons of raisins  ¨ 1 tablespoon of honey  · For other sources, check the sugar content on the nutrition label to figure out how much you need to eat or drink to get at least 15 grams of sugar.  · Check your blood sugar 15 minutes after treating yourself. If it is still low, take another 15 to 20 grams of fast-acting sugar. Test again in 15 minutes. If it remains low, call your healthcare provider or go to an emergency room.  · Once blood sugar returns to normal, eat a snack or meal to keep your blood sugar in a safe range.  In the future, if you are unable to eat your normal amount due to illness or vomiting, stop taking your diabetes medicine and contact your healthcare provider.  Wear a medical alert bracelet or necklace, or carry a card in your wallet explaining that you have diabetes. If you have a severe hypoglycemic reaction and cannot give this information, it will help medical personnel provide proper care.  Follow-up care  Follow up with your healthcare provider, or as advised.  If you have the equipment to monitor your blood sugar, do so at least twice a day--before breakfast and before dinner. Do this for the next 5 days. See your healthcare provider during the next week to review these records. This will help determine if you need an adjustment in your diabetes medicine.  For more information about diabetes, contact the American Diabetes Association, at www.diabetes.org.  When to seek medical advice  Call your healthcare provider right away if any of these symptoms of low blood sugar occur.  · Fatigue  · Headache  · Shakes  · Excess sweating  · Hunger  · Feeling anxious or restless  · Vision changes  · Drowsiness  · Weakness  · Confusion  · Personality changes  · Seizure or loss of consciousness  Date Last Reviewed: 6/1/2016  © 5139-9612 [employer]. [address] [place] [address], [place] [address]. All rights reserved. This information is not intended as a  substitute for professional medical care. Always follow your healthcare professional's instructions.

## 2018-06-19 ENCOUNTER — OFFICE VISIT (OUTPATIENT)
Dept: SLEEP MEDICINE | Facility: CLINIC | Age: 68
End: 2018-06-19
Payer: MEDICARE

## 2018-06-19 VITALS
HEART RATE: 79 BPM | BODY MASS INDEX: 29.12 KG/M2 | SYSTOLIC BLOOD PRESSURE: 118 MMHG | HEIGHT: 68 IN | WEIGHT: 192.13 LBS | OXYGEN SATURATION: 96 % | DIASTOLIC BLOOD PRESSURE: 68 MMHG

## 2018-06-19 DIAGNOSIS — G47.30 SLEEP APNEA, UNSPECIFIED TYPE: Primary | ICD-10-CM

## 2018-06-19 PROCEDURE — 99214 OFFICE O/P EST MOD 30 MIN: CPT | Mod: PBBFAC | Performed by: NURSE PRACTITIONER

## 2018-06-19 PROCEDURE — 99999 PR PBB SHADOW E&M-EST. PATIENT-LVL IV: CPT | Mod: PBBFAC,,, | Performed by: NURSE PRACTITIONER

## 2018-06-19 PROCEDURE — 99214 OFFICE O/P EST MOD 30 MIN: CPT | Mod: S$PBB,,, | Performed by: NURSE PRACTITIONER

## 2018-06-19 RX ORDER — LANOLIN ALCOHOL/MO/W.PET/CERES
CREAM (GRAM) TOPICAL
Refills: 3 | COMMUNITY
Start: 2018-04-17

## 2018-06-19 RX ORDER — GLUCOSAM/CHON-MSM1/C/MANG/BOSW 500-416.6
TABLET ORAL
Refills: 0 | COMMUNITY
Start: 2018-05-24

## 2018-06-19 RX ORDER — CALCIUM CITRATE/VITAMIN D3 200MG-6.25
TABLET ORAL
Refills: 0 | COMMUNITY
Start: 2018-05-24

## 2018-06-19 RX ORDER — METFORMIN HYDROCHLORIDE 1000 MG/1
1000 TABLET ORAL 2 TIMES DAILY WITH MEALS
COMMUNITY

## 2018-06-19 RX ORDER — OLANZAPINE 5 MG/1
TABLET ORAL
Refills: 3 | COMMUNITY
Start: 2018-06-01

## 2018-06-19 NOTE — PROGRESS NOTES
Campbell Costa  was seen as a new patient for the evaluation of obstructive sleep apnea.    CHIEF COMPLAINT:    Chief Complaint   Patient presents with    Snoring       06/19/2018 DIPIKA Glez NP: Initial HISTORY OF PRESENT ILLNESS: Campbell Costa is a 67 y.o. male is here for sleep evaluation.       Patient complaints include: snoring, witnessed apneas, interrupted sleep, and excessive daytime sleepiness.   Diagnosed with EMI via PSG at [facility] ~ 2 years ago and was set up with PAP machine  Used PAP machine until 1 year ago but stopped due to lapse in Rx for supplies and [facility] closed and could not get Rx  Requires requal sleep study to resume PAP therapy    Takes Zyprexa 5 mg 1/2 tablet qhs prn sleep initiation and maintenance; helps with initiating sleep but sleep still disrupted     Since EMI dx has been diagnosed with seizure disorder     Wife concerned that since no PAP use that he wakes up confused in mornings, concerned for seizure activity during sleep    Visual deficit since CVA in 2014, 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 No longer drives.     Brought [facility] PAP machine, attempted to interrogate but machine would not turn on.     Denies symptoms of restless legs or kicking during sleep. Takes Neurontin for chronic back pain.     Occupation: retired     EPWORTH SLEEPINESS SCALE 6/19/2018   Sitting and reading 3   Watching TV 3   Sitting, inactive in a public place (e.g. a theatre or a meeting) 3   As a passenger in a car for an hour without a break 3   Lying down to rest in the afternoon when circumstances permit 3   Sitting and talking to someone 2   Sitting quietly after a lunch without alcohol 3   In a car, while stopped for a few minutes in traffic 2   Total score 22       SLEEP ROUTINE:    Bed partner:  Wife  Sleep Clinic New Patient 6/19/2018   What time do you go to bed on a week day? (Give a range) Monday and Tues 10pm and the rest of the day 7 to 8   What time do you go to bed on a day off? (Give a  range) 7pm   How long does it take you to fall asleep? (Give a range) quickly   How long does it take you to fall back into sleep? (Give a range) quickly   What time do you wake up to start your day on a week day? (Give a range) about 6 am or earlier   What time do you wake up to start your day on a day off? (Give a range) as soon as the sun rises   What time do you get out of bed? (Give a range) as soon as i'm up   Rate your sleep quality from 0 to 5 (0-poor, 5-great). 2   Have you experienced:  Weight gain?   Have you ever had a sleep study/CPAP machine/surgery for sleep apnea? Yes   Have you ever had a CPAP machine for sleep apnea? Yes   Have you ever had surgery for sleep apnea? No          PAST MEDICAL HISTORY:    Active Ambulatory Problems     Diagnosis Date Noted    Seizure disorder 01/21/2018    Hyperlipidemia 01/21/2018    Type 2 diabetes mellitus 01/21/2018    Hypoglycemia 01/21/2018    Subcutaneous air, initial encounter 01/21/2018    Iron deficiency anemia 01/21/2018     Resolved Ambulatory Problems     Diagnosis Date Noted    Hypotension 01/21/2018    Hypomagnesemia 01/23/2018     Past Medical History:   Diagnosis Date    Anxiety     COPD (chronic obstructive pulmonary disease)     Diabetes mellitus     GERD (gastroesophageal reflux disease)     Hereditary acanthocytosis     Hypercholesterolemia     Hypertension     Iron deficiency anemia     Seizures     Stroke                 PAST SURGICAL HISTORY:    Past Surgical History:   Procedure Laterality Date    CERVICAL FUSION      THYROIDECTOMY, PARTIAL Left     November 2017    TUMOR EXCISION      Fatty tumor excised from the right jaw         FAMILY HISTORY:                Family History   Problem Relation Age of Onset    No Known Problems Mother     No Known Problems Father        SOCIAL HISTORY:          Tobacco:   History   Smoking Status    Former Smoker    Quit date: 1/21/2014   Smokeless Tobacco    Never Used     Comment:  Pt's wife reports pt is a recovering alcoholic.       Alcohol use:    History   Alcohol Use No                 ALLERGIES:    Review of patient's allergies indicates:   Allergen Reactions    Lisinopril        CURRENT MEDICATIONS:    Current Outpatient Prescriptions   Medication Sig Dispense Refill    aspirin (ECOTRIN) 81 MG EC tablet Take 81 mg by mouth once daily.      blood-glucose meter (FREESTYLE SYSTEM KIT) kit Use as instructed 1 each 0    cyanocobalamin (VITAMIN B-12) 500 MCG tablet Take 500 mcg by mouth 2 (two) times daily.      divalproex (DEPAKOTE) 500 MG TbEC Take 1,000 mg by mouth every evening.      divalproex ER (DEPAKOTE) 500 MG Tb24 Take 500 mg by mouth once daily.      escitalopram oxalate (LEXAPRO) 10 MG tablet Take 10 mg by mouth once daily.      ferrous gluconate (FERGON) 240 (27 FE) MG tablet Take 240 mg by mouth 2 (two) times daily.      gabapentin (NEURONTIN) 300 MG capsule Take 300 mg by mouth once daily. Take 1 capsule PO in the am & 2 capsules in the evening       hydrocodone-acetaminophen 7.5-325mg (NORCO) 7.5-325 mg per tablet Take 1 tablet by mouth every 6 (six) hours as needed for Pain.      magnesium oxide (MAG-OX) 400 mg tablet TK 1 T PO QD PRN  3    metFORMIN (GLUCOPHAGE) 1000 MG tablet Take 1,000 mg by mouth 2 (two) times daily with meals.      midodrine (PROAMATINE) 2.5 MG Tab Take 2.5 mg by mouth 3 (three) times daily.      multivitamin capsule Take 1 capsule by mouth once daily.      OLANZapine (ZYPREXA) 5 MG tablet TK ONE-HALF T PO ONCE A DAY  3    pantoprazole (PROTONIX) 40 MG tablet Take 40 mg by mouth once daily.      rosuvastatin (CRESTOR) 40 MG Tab Take 10 mg by mouth every evening.      tamsulosin (FLOMAX) 0.4 mg Cp24 Take 0.4 mg by mouth once daily.      TRUE METRIX GLUCOSE TEST STRIP Strp USE TO TEST BS QID  0    TRUEPLUS LANCETS 28 gauge Misc USE TO TEST BS QID  0    vitamin D 1000 units Tab Take 1,000 Units by mouth once daily.      cyproheptadine  "(PERIACTIN) 4 mg tablet Take 4 mg by mouth 3 (three) times daily as needed.       No current facility-administered medications for this visit.                   REVIEW OF SYSTEMS:     Sleep related symptoms as per HPI.  Sleep Clinic ROS  6/19/2018   Difficulty breathing through the nose?  Sometimes   Sore throat or dry mouth in the morning? Yes   Irregular or very fast heart beat?  No   Shortness of breath?  Sometimes   Acid reflux? No   Body aches and pains?  Yes   Morning headaches? Sometimes   Dizziness? Yes   Mood changes?  Yes   Do you exercise?  No   Do you feel like moving your legs a lot?  No       Otherwise, a balance of systems reviewed is negative.          PHYSICAL EXAM:  Vitals:    06/19/18 0813   BP: 118/68   Pulse: 79   SpO2: 96%   Weight: 87.1 kg (192 lb 2.1 oz)   Height: 5' 8" (1.727 m)   PainSc:   7   PainLoc: Back     Body mass index is 29.21 kg/m².     GENERAL: Overweight development, well groomed  HEENT:  Conjunctivae are non-erythematous; Pupils equal, round, and reactive to light; Nose is symmetrical; Nasal mucosa is pink and moist; Septum is midline; Inferior turbinates are normal; Nasal airflow is normal; Posterior pharynx is pink; Modified Mallampati: IV; Posterior palate is normal; Tonsils +1; Uvula is normal and pink;Tongue is normal; Dentition is edentulous (awaiting dentures); No TMJ tenderness; Jaw opening and protrusion without click and without discomfort.  NECK: Supple. Neck circumference is 15 inches. No thyromegaly. No palpable nodes.    SKIN: On face and neck: No abrasions, no rashes, no lesions.  No subcutaneous nodules are palpable.  RESPIRATORY: Chest is clear to auscultation.  Normal chest expansion and non-labored breathing at rest.  CARDIOVASCULAR: Normal S1, S2.  No murmurs, gallops or rubs. No carotid bruits bilaterally.  EXTREMITIES: No edema. No clubbing. No cyanosis. Station normal. Gait unsteady, walker-assist        NEURO/PSYCH: Oriented to time, place and person. " Normal attention span and concentration. Affect is full. Mood is normal.                                              ASSESSMENT:    Sleep apnea, unspecified; previously diagnosed and treated with PAP therapy, requires requal sleep study to resume treatment.  The patient symptomatically has snoring, witnessed apneas, interrupted sleep, and excessive daytime sleepiness with findings of crowded oral airway and elevated body mass index. Medical co-morbidities: hx CVA (2014), seizure disorder, anxiety, COPD, DM2, GERD, and obesity.   This warrants re-evaluation of  obstructive sleep apnea.      PLAN:    Diagnostic: Polysomnogram. The nature of this procedure and its indication was discussed with the patient. Patient will be contacted after sleep study is done.  Call with results (requires transportation service to get to appointments, phone review most convenient). Order machine/supplies prn; RTC 31 - 90 days after set up.      Education: During our discussion today, we talked about the etiology of obstructive sleep apnea as well as the potential ramifications of untreated sleep apnea, which could include daytime sleepiness, hypertension, heart disease and/or stroke. We discussed potential treatment options, which could include weight loss, body positioning, continuous positive airway pressure (CPAP), OA, EPAP, or referral for surgical consideration.     Precautions: The patient was advised to abstain from driving should they feel sleepy  or drowsy - pt no longer drives     Thank you for allowing me the opportunity to participate in the care of your patient.

## 2018-06-19 NOTE — PATIENT INSTRUCTIONS
Radha or Tim will contact you to schedule your sleep study. Their number is [phone] (ext 2). The [facility] is located on 7th floor of the [facility].    We will call you when the sleep study results are ready - if you have not heard from us by 2 weeks from the date of the study, please call [phone] (ext 1).    You are advised to abstain from driving should you feel sleepy or drowsy.

## 2018-07-21 ENCOUNTER — HOSPITAL ENCOUNTER (OUTPATIENT)
Dept: SLEEP MEDICINE | Facility: OTHER | Age: 68
Discharge: HOME | End: 2018-07-21
Attending: NURSE PRACTITIONER
Payer: MEDICARE

## 2018-07-21 DIAGNOSIS — G47.30 SLEEP APNEA, UNSPECIFIED TYPE: ICD-10-CM

## 2018-07-21 DIAGNOSIS — R06.3 CENTRAL SLEEP APNEA DUE TO CHEYNE-STOKES RESPIRATION: ICD-10-CM

## 2018-07-21 PROCEDURE — 95811 POLYSOM 6/>YRS CPAP 4/> PARM: CPT | Mod: 26,,, | Performed by: INTERNAL MEDICINE

## 2018-07-21 PROCEDURE — 95811 POLYSOM 6/>YRS CPAP 4/> PARM: CPT

## 2018-07-22 NOTE — PROGRESS NOTES
End of The night summary    Type of Study Performed on (TERESE) SPLIT    Patient education/cpap information prior to Study/Setup                                    EKG:  Appears to be- NSR                             Low Spo2  89%                                 Any Difficulties recording: Tir to switch pt to full face mask for lip/ mouth leak    Optimal pressure#  13 or 14?    MASK:  Full face comfort gel xl for lip leak.    Pt reaction to CPAP: pt reports comfortable uses one at home    Tech summary comments:    pt met criteria for split on cpap, soft to moderate snoring observed, frequent bathroom observed, most of pts events observed supine position , possible periodic breathing observed, some central events observed with cpap, pt reports of sleeping well no reports of discomfort    pt would probably do better on bipap or asv for centrals and comfort

## 2018-08-03 ENCOUNTER — TELEPHONE (OUTPATIENT)
Dept: SLEEP MEDICINE | Facility: CLINIC | Age: 68
End: 2018-08-03

## 2018-08-03 DIAGNOSIS — G47.31 CENTRAL SLEEP APNEA: Primary | ICD-10-CM

## 2018-08-03 NOTE — TELEPHONE ENCOUNTER
"Called patient and pt wife answered, so left message with her notifying her that "The overall AHI was 37.6 with an oxygen anjelica of 89.0%. The central sleep apnea index was 37.2 events per hour.      Central sleep apnea. Effective control of sleep disordered breathing was not achieved due to persisting central events.      Recommend dedicated titration study.      Ovena, please notify pt that further sleep testing to determine effective settings to control sleep apnea necessary before PAP machine can be set up. After second sleep study, will proceed with order PAP machine at [facility]. "      Pt wife Asked if she can be with him for the next study since he will be needing her assistance?    Advised her to wait for the lab to call with further info concerning her coming with pt for sleep study.     "

## 2018-08-03 NOTE — TELEPHONE ENCOUNTER
07/21/2018 Split night study 192 lb. The overall AHI was 37.6 with an oxygen anjelica of 89.0%. The central sleep apnea index was 37.2 events per hour.     Central sleep apnea. Effective control of sleep disordered breathing was not achieved due to persisting central events.     Recommend dedicated titration study.     Ovena, please notify pt that further sleep testing to determine effective settings to control sleep apnea necessary before PAP machine can be set up. After second sleep study, will proceed with order PAP machine at [facility].

## 2018-08-20 ENCOUNTER — TELEPHONE (OUTPATIENT)
Dept: SLEEP MEDICINE | Facility: OTHER | Age: 68
End: 2018-08-20

## 2018-08-20 ENCOUNTER — TELEPHONE (OUTPATIENT)
Dept: SLEEP MEDICINE | Facility: CLINIC | Age: 68
End: 2018-08-20

## 2018-08-20 NOTE — TELEPHONE ENCOUNTER
Called patient wife back and informed her that (pt) is on the list of patient to be call and get schedule for sleep study since he is approved

## 2018-08-20 NOTE — TELEPHONE ENCOUNTER
----- Message from Lolajana Turner sent at 8/20/2018 11:40 AM CDT -----  Contact: JOEY FANG [[id number]]            Name of Who is Calling: JOEY FANG [[id number]]      What is the request in detail: Patient's wife called regarding his C-pap machine. Please call her.     Can the clinic reply by [facility]: no      [facility] Back [facility]: [phone]

## 2018-08-24 ENCOUNTER — HOSPITAL ENCOUNTER (OUTPATIENT)
Dept: SLEEP MEDICINE | Facility: OTHER | Age: 68
Discharge: HOME | End: 2018-08-24
Attending: NURSE PRACTITIONER
Payer: MEDICARE

## 2018-08-24 DIAGNOSIS — G47.31 CENTRAL SLEEP APNEA: ICD-10-CM

## 2018-08-24 PROCEDURE — 95811 POLYSOM 6/>YRS CPAP 4/> PARM: CPT | Mod: 26,,, | Performed by: PSYCHIATRY & NEUROLOGY

## 2018-08-24 PROCEDURE — 95811 POLYSOM 6/>YRS CPAP 4/> PARM: CPT

## 2018-08-25 NOTE — PROGRESS NOTES
Campbell Costa to [facility] on 8/24/18 for an overnight ASV tritation study. Pt educated on set up and CPAP prior to the start of the study.     Pt wearing a medium Simplus FF mask, with heated humidity and CFLEX. No snoring observed.     EKG Lead II appears in NSR with occasional PVC.    In AM pt stated he liked the mask a lot.    Post study information given to pt in AM.

## 2018-09-17 ENCOUNTER — TELEPHONE (OUTPATIENT)
Dept: SLEEP MEDICINE | Facility: CLINIC | Age: 68
End: 2018-09-17

## 2018-09-17 DIAGNOSIS — G47.31 COMPLEX SLEEP APNEA SYNDROME: Primary | ICD-10-CM

## 2018-11-01 ENCOUNTER — TELEPHONE (OUTPATIENT)
Dept: SLEEP MEDICINE | Facility: CLINIC | Age: 68
End: 2018-11-01

## 2018-11-01 NOTE — TELEPHONE ENCOUNTER
Was speaking with the wife since she had missed her appointment and after rescheduling, was advised that Her , the patient, had never gotten results from his sleep study. Informed her that there was a message left, and the provider recommended that an Echo be done before starting Cpap therapy.   Scheduled the echo for the patient for the same date as her rescheduled appointment with Louis.

## 2022-10-18 ENCOUNTER — TELEPHONE (OUTPATIENT)
Dept: NEUROLOGY | Facility: HOSPITAL | Age: 72
End: 2022-10-18
Payer: MEDICARE

## 2022-10-18 NOTE — TELEPHONE ENCOUNTER
Clinic appt scheduled with Emilie, wife, on Wednesday, November 9, 2022 at 915am from referral by DARION Saleem. Clinic address given and repeated correctly.

## 2022-11-07 ENCOUNTER — TELEPHONE (OUTPATIENT)
Dept: NEUROLOGY | Facility: HOSPITAL | Age: 72
End: 2022-11-07
Payer: MEDICAID

## 2022-11-07 NOTE — TELEPHONE ENCOUNTER
Pt having a procedure on Tuesday, November 8, 2022. Emilie, wife, rescheduled clinic appt to Monday, November 14, 2022 at 2pm.  Wife repeated date and time correctly.

## 2022-11-07 NOTE — TELEPHONE ENCOUNTER
----- Message from Davy Sotomayor sent at 11/7/2022 10:13 AM CST -----  Contact: pt  .Type:  Sooner Apoointment Request    Caller is requesting a sooner appointment.  Caller declined first available appointment listed below.  Caller will not accept being placed on the waitlist and is requesting a message be sent to doctor.  Name of Caller:pt  When is the first available appointment?11/09/2022  Symptoms:anemia ref by Catalina Payne  Would the patient rather a call back or a response via [facility]? Call back  Best Call Back Number:[phone]  Additional Information: Pt. Wife called an stated her  is having a procedure on 11/08/2022 and she would like to reschedule his appt with Dr. Schreiber.

## 2022-11-14 ENCOUNTER — OFFICE VISIT (OUTPATIENT)
Dept: NEUROLOGY | Facility: HOSPITAL | Age: 72
End: 2022-11-14
Attending: INTERNAL MEDICINE
Payer: MEDICARE

## 2022-11-14 VITALS
TEMPERATURE: 99 F | WEIGHT: 191.5 LBS | BODY MASS INDEX: 29.02 KG/M2 | SYSTOLIC BLOOD PRESSURE: 152 MMHG | DIASTOLIC BLOOD PRESSURE: 80 MMHG | HEIGHT: 68 IN | HEART RATE: 77 BPM

## 2022-11-14 DIAGNOSIS — D50.9 IRON DEFICIENCY ANEMIA, UNSPECIFIED IRON DEFICIENCY ANEMIA TYPE: Primary | ICD-10-CM

## 2022-11-14 PROCEDURE — 99215 OFFICE O/P EST HI 40 MIN: CPT | Performed by: INTERNAL MEDICINE

## 2022-11-14 RX ORDER — FAMOTIDINE 20 MG/1
20 TABLET, FILM COATED ORAL
COMMUNITY
Start: 2022-08-17

## 2022-11-14 RX ORDER — EZETIMIBE 10 MG/1
10 TABLET ORAL
COMMUNITY
Start: 2021-12-02 | End: 2022-12-02

## 2022-11-14 RX ORDER — CLOPIDOGREL BISULFATE 75 MG/1
75 TABLET ORAL DAILY
COMMUNITY
Start: 2022-06-25

## 2022-11-14 RX ORDER — SODIUM, POTASSIUM,MAG SULFATES 17.5-3.13G
2 SOLUTION, RECONSTITUTED, ORAL ORAL ONCE
Qty: 1 KIT | Refills: 0 | Status: SHIPPED | OUTPATIENT
Start: 2022-11-14 | End: 2022-11-14

## 2022-11-14 RX ORDER — ATORVASTATIN CALCIUM 80 MG/1
80 TABLET, FILM COATED ORAL DAILY
COMMUNITY
Start: 2022-10-30

## 2022-11-14 NOTE — PROGRESS NOTES
"[facility]    CC: Iron Deficiency anemia    HPI 72 y.o. male w/ COPD, GERD, HLD, HTN, CVA on plavix who presents for evaluation of GRAEME.     Majority of history provided by wife due to neurologic deficits. Reported longstanding anemia, at least 20 years, has required transfusions a few times, most recently was several years ago. Notes fairly frequent nosebleeds but never requiring cauterization. Has given blood before but almost ten years ago. Stools are always black due to oral iron but they are intermittently tarry. He has intermittent diffuse abdominal pain and nausea. He has never been on parenteral iron. No abdominal surgeries.    Past Medical History  Past Medical History:   Diagnosis Date    Anxiety     COPD (chronic obstructive pulmonary disease)     Diabetes mellitus     GERD (gastroesophageal reflux disease)     Hereditary acanthocytosis     Hypercholesterolemia     Hypertension     Iron deficiency anemia     Seizures     Stroke          Physical Examination  BP (!) 152/80 (BP Location: Left arm, Patient Position: Sitting)   Pulse 77   Temp 98.5 °F (36.9 °C) (Oral)   Ht 5' 8" (1.727 m)   Wt 86.8 kg (191 lb 7.5 oz)   BMI 29.11 kg/m²   General appearance: alert, cooperative, no distress  Lungs: clear to auscultation bilaterally, no dullness to percussion bilaterally  Heart: regular rate and rhythm without rub; no displacement of the PMI   Abdomen: soft, non-tender; bowel sounds normoactive; no organomegaly  Neuro: chronic neurologic defecits from prior CVA noted    Assessment:   72M with history of CVA on ASA, COPD, GERD, HTN, HLD who presents for evaluation of longstanding GRAEME of unclear etiology. Prior unrevealing EGD/colon in 2019 but needs updated dual endoscopy prior to considering VCE.    Plan:  - UA today given recent urinary symptoms at patient request  - Anti parietal cell and intrinsic factor abs and TTA/IGA  - Repeat colonoscopy and EGD with push (with PCF) with mapping biopsies, " gastric pH, and celiac biopsies given reported longstanding anemia, if negative a VCE is the next step    Thomas Schreiber MD   [address] [facility], [address]   [place] [address] [phone]

## 2022-11-22 ENCOUNTER — TELEPHONE (OUTPATIENT)
Dept: NEUROLOGY | Facility: HOSPITAL | Age: 72
End: 2022-11-22
Payer: MEDICAID

## 2022-11-22 ENCOUNTER — PATIENT MESSAGE (OUTPATIENT)
Dept: NEUROLOGY | Facility: HOSPITAL | Age: 72
End: 2022-11-22
Payer: MEDICAID

## 2022-11-22 NOTE — TELEPHONE ENCOUNTER
Suprep instructions with Emilie, wife and sent via pt portal.  SUPREP Instructions    [facility] [address]  Clinic Office [phone]  Endoscopy Lab [phone]    You are scheduled for a Colonoscopy with Dr. Schreiber  on Monday, November 28, 2022 at [facility] in [place].    Check in at the Hospital -1st floor, Information desk.   Call [phone] to reschedule.    An adult friend/family member must come with you to drive you home.  You cannot drive, take a taxi, Uber/Lyft or bus to leave the Endoscopy Center alone.  If you do not have someone to drive you home, your test will be cancelled.     Please follow the directions of your doctor if you take any pills that thin your blood. If you take these meds: Aggrenox, Brilinta, Effient, Eliquis, Lovenox, Plavix, Pletal, Pradaxa, Ticilid, Xarelto or Coumadin, let the doctor's office know.    DON'T: On the morning of the test do not take insulin or pills for diabetes.     DO: On the morning of the test, do take any pills for blood pressure, heart, anti-rejection and or seizures with a small sip of water. Bring any inhalers with you.    To have a good prep, you must follow these instructions - please do not use the directions from the pharmacy.    The doctor will send a prescription for the SUPREP.      The Day Before the test:    You can only drink CLEAR LIQUIDS the whole day before your test.  You can't eat any food for the whole day.    You CAN have:  Water, Coffee or decaf coffee (no milk or cream)  Tea  Soft drinks - regular and sugar free  Jello (green or yellow)  Apple Juice, white grape juice, white cranberry juice  Gatorade, Power Aid, Crystal Light, Nathan Aid  Lemonade and Limeade  Bouillon, clear soup  Snowball, popsicles  YOU CAN'T DRINK ANYTHING RED, PURPLE ORANGE OR BLUE   YOU CAN'T DRINK ALCOHOL  ONLY DRINK WHAT IS ON THE LIST      At 5 pm the night before your test:    Pour the 1st bottle of SUPREP into the cup  provided in the box. Add water to the line on the cup and mix well.  Drink the whole cup and then drink 2 more full cups of water over 1 hour.  This can be easier to drink if it is cold. You can mix it 20 minutes ahead of time and place in the refrigerator before you drink it.  You must drink it within 30-45 minutes of mixing it.  Do NOT pour the drink over ice.  You can drink it with a straw.    The Day of the test - We will call you 2 days before your test to tell you what time to get there.    5 hours before you come to the hospital (this may be in the middle of the night)  Pour the 2nd bottle of SUPREP into the cup provided in the box. Add water to the line on the cup and mix well.  Drink the whole cup and then drink 2 more full cups of water over 1 hour.  It might be easier to drink if it is cold. You can mix it 20 minutes ahead of time and place in the refrigerator before you drink it.  You must drink it within 30-45 minutes of mixing it.  Do NOT pour the drink over ice.  You can drink it with a straw.    YOU CAN'T EAT OR DRINK ANYTHING ELSE ONCE YOU FINISH THE PREP    Leave all valuables and jewelry at home. You will be at the hospital for 2-4 hours.    Call the Endoscopy department at [phone] with any questions about your procedure.

## 2022-11-23 ENCOUNTER — TELEPHONE (OUTPATIENT)
Dept: ENDOSCOPY | Facility: HOSPITAL | Age: 72
End: 2022-11-23
Payer: MEDICAID

## 2022-11-23 NOTE — TELEPHONE ENCOUNTER
Spoke with patient's wife Emilie about arrival time @ 1000.   Covid test =     Prep instructions reviewed: the day before the procedure, follow a clear liquid diet all day, then start the first 1/2 of prep at 5pm and take 2nd 1/2 of prep @ 0500 .  Pt must be completely NPO when prep completed @ 0700.              Medications: Wife states Dr. Schreiber instructed patient to continue PLAVIX  Do not take Insulin or oral diabetic medications the day of the procedure.  Take as prescribed: heart, seizure and blood pressure medication in the morning with a sip of water (less than an ounce).  Take any breathing medications and bring inhalers to hospital with you Leave all valuables and jewelry at home.     Wear comfortable clothes to procedure to change into hospital gown You cannot drive for 24 hours after your procedure because you will receive sedation for your procedure to make you comfortable.  A ride must be provided at discharge.

## 2022-11-28 ENCOUNTER — HOSPITAL ENCOUNTER (OUTPATIENT)
Facility: HOSPITAL | Age: 72
Discharge: HOME | End: 2022-11-28
Attending: INTERNAL MEDICINE | Admitting: INTERNAL MEDICINE
Payer: MEDICARE

## 2022-11-28 ENCOUNTER — ANESTHESIA EVENT (OUTPATIENT)
Dept: ENDOSCOPY | Facility: HOSPITAL | Age: 72
End: 2022-11-28
Payer: MEDICARE

## 2022-11-28 ENCOUNTER — ANESTHESIA (OUTPATIENT)
Dept: ENDOSCOPY | Facility: HOSPITAL | Age: 72
End: 2022-11-28
Payer: MEDICARE

## 2022-11-28 VITALS
SYSTOLIC BLOOD PRESSURE: 130 MMHG | OXYGEN SATURATION: 95 % | RESPIRATION RATE: 18 BRPM | DIASTOLIC BLOOD PRESSURE: 80 MMHG | WEIGHT: 186 LBS | HEART RATE: 61 BPM | TEMPERATURE: 98 F | BODY MASS INDEX: 28.19 KG/M2 | HEIGHT: 68 IN

## 2022-11-28 DIAGNOSIS — D50.0 ANEMIA DUE TO CHRONIC BLOOD LOSS: Primary | ICD-10-CM

## 2022-11-28 DIAGNOSIS — D50.9 IRON DEFICIENCY ANEMIA: ICD-10-CM

## 2022-11-28 PROCEDURE — 45378 DIAGNOSTIC COLONOSCOPY: CPT | Performed by: INTERNAL MEDICINE

## 2022-11-28 PROCEDURE — 83986 ASSAY PH BODY FLUID NOS: CPT | Performed by: INTERNAL MEDICINE

## 2022-11-28 PROCEDURE — 25000003 PHARM REV CODE 250: Performed by: STUDENT IN AN ORGANIZED HEALTH CARE EDUCATION/TRAINING PROGRAM

## 2022-11-28 PROCEDURE — 44366 SMALL BOWEL ENDOSCOPY: CPT | Performed by: INTERNAL MEDICINE

## 2022-11-28 PROCEDURE — D9220A PRA ANESTHESIA: Mod: CRNA,,, | Performed by: STUDENT IN AN ORGANIZED HEALTH CARE EDUCATION/TRAINING PROGRAM

## 2022-11-28 PROCEDURE — 37000008 HC ANESTHESIA 1ST 15 MINUTES: Performed by: INTERNAL MEDICINE

## 2022-11-28 PROCEDURE — 27202087 HC PROBE, APC: Performed by: INTERNAL MEDICINE

## 2022-11-28 PROCEDURE — 37000009 HC ANESTHESIA EA ADD 15 MINS: Performed by: INTERNAL MEDICINE

## 2022-11-28 PROCEDURE — D9220A PRA ANESTHESIA: ICD-10-PCS | Mod: CRNA,,, | Performed by: STUDENT IN AN ORGANIZED HEALTH CARE EDUCATION/TRAINING PROGRAM

## 2022-11-28 PROCEDURE — D9220A PRA ANESTHESIA: Mod: ANES,,, | Performed by: ANESTHESIOLOGY

## 2022-11-28 PROCEDURE — 63600175 PHARM REV CODE 636 W HCPCS: Performed by: STUDENT IN AN ORGANIZED HEALTH CARE EDUCATION/TRAINING PROGRAM

## 2022-11-28 PROCEDURE — 25000003 PHARM REV CODE 250: Performed by: INTERNAL MEDICINE

## 2022-11-28 PROCEDURE — 27201012 HC FORCEPS, HOT/COLD, DISP: Performed by: INTERNAL MEDICINE

## 2022-11-28 PROCEDURE — D9220A PRA ANESTHESIA: ICD-10-PCS | Mod: ANES,,, | Performed by: ANESTHESIOLOGY

## 2022-11-28 PROCEDURE — 82962 GLUCOSE BLOOD TEST: CPT | Performed by: INTERNAL MEDICINE

## 2022-11-28 RX ORDER — SODIUM CHLORIDE 9 MG/ML
INJECTION, SOLUTION INTRAVENOUS CONTINUOUS
Status: DISCONTINUED | OUTPATIENT
Start: 2022-11-28 | End: 2022-11-28 | Stop reason: HOSPADM

## 2022-11-28 RX ORDER — LIDOCAINE HCL/PF 100 MG/5ML
SYRINGE (ML) INTRAVENOUS
Status: DISCONTINUED | OUTPATIENT
Start: 2022-11-28 | End: 2022-11-28

## 2022-11-28 RX ORDER — DEXTROMETHORPHAN/PSEUDOEPHED 2.5-7.5/.8
DROPS ORAL
Status: COMPLETED | OUTPATIENT
Start: 2022-11-28 | End: 2022-11-28

## 2022-11-28 RX ORDER — PROPOFOL 10 MG/ML
INJECTION, EMULSION INTRAVENOUS CONTINUOUS PRN
Status: DISCONTINUED | OUTPATIENT
Start: 2022-11-28 | End: 2022-11-28

## 2022-11-28 RX ORDER — SODIUM CHLORIDE 0.9 % (FLUSH) 0.9 %
10 SYRINGE (ML) INJECTION
Status: DISCONTINUED | OUTPATIENT
Start: 2022-11-28 | End: 2022-11-28 | Stop reason: HOSPADM

## 2022-11-28 RX ORDER — ONDANSETRON 2 MG/ML
4 INJECTION INTRAMUSCULAR; INTRAVENOUS DAILY PRN
Status: DISCONTINUED | OUTPATIENT
Start: 2022-11-28 | End: 2022-11-28 | Stop reason: HOSPADM

## 2022-11-28 RX ORDER — PROPOFOL 10 MG/ML
INJECTION, EMULSION INTRAVENOUS
Status: DISCONTINUED | OUTPATIENT
Start: 2022-11-28 | End: 2022-11-28

## 2022-11-28 RX ORDER — PHENYLEPHRINE HYDROCHLORIDE 10 MG/ML
INJECTION INTRAVENOUS
Status: DISCONTINUED | OUTPATIENT
Start: 2022-11-28 | End: 2022-11-28

## 2022-11-28 RX ADMIN — LIDOCAINE HYDROCHLORIDE 100 MG: 20 INJECTION, SOLUTION INTRAVENOUS at 11:11

## 2022-11-28 RX ADMIN — PROPOFOL 60 MG: 10 INJECTION, EMULSION INTRAVENOUS at 11:11

## 2022-11-28 RX ADMIN — GLYCOPYRROLATE 0.2 MG: 0.2 INJECTION, SOLUTION INTRAMUSCULAR; INTRAVITREAL at 10:11

## 2022-11-28 RX ADMIN — TOPICAL ANESTHETIC 1 EACH: 200 SPRAY DENTAL; PERIODONTAL at 10:11

## 2022-11-28 RX ADMIN — PROPOFOL 150 MCG/KG/MIN: 10 INJECTION, EMULSION INTRAVENOUS at 11:11

## 2022-11-28 RX ADMIN — SODIUM CHLORIDE: 0.9 INJECTION, SOLUTION INTRAVENOUS at 10:11

## 2022-11-28 RX ADMIN — PHENYLEPHRINE HYDROCHLORIDE 100 MCG: 10 INJECTION INTRAVENOUS at 11:11

## 2022-11-28 RX ADMIN — PHENYLEPHRINE HYDROCHLORIDE 200 MCG: 10 INJECTION INTRAVENOUS at 11:11

## 2022-11-28 NOTE — PROVATION PATIENT INSTRUCTIONS
Discharge Summary/Instructions after an Endoscopic Procedure  Patient Name: Campbell Costa  Patient MRN: [id number]  Patient YOB: 1950 Monday, November 28, 2022  Thomas Schreiber MD  Dear patient,  As a result of recent federal legislation (The Federal Cures Act), you may   receive lab or pathology results from your procedure in your [facility]   account before your physician is able to contact you. Your physician or   their representative will relay the results to you with their   recommendations at their soonest availability.  Thank you,  Your health is very important to us during the Covid Crisis. Following your   procedure today, you will receive a daily text for 2 weeks asking about   signs or symptoms of Covid 19.  Please respond to this text when you   receive it so we can follow up and keep you as safe as possible.   RESTRICTIONS:  During your procedure today, you received medications for sedation.  These   medications may affect your judgment, balance and coordination.  Therefore,   for 24 hours, you have the following restrictions:   - DO NOT drive a car, operate machinery, make legal/financial decisions,   sign important papers or drink alcohol.    ACTIVITY:  Today: no heavy lifting, straining or running due to procedural   sedation/anesthesia.  The following day: return to full activity including work.  DIET:  Eat and drink normally unless instructed otherwise.     TREATMENT FOR COMMON SIDE EFFECTS:  - Mild abdominal pain, nausea, belching, bloating or excessive gas:  rest,   eat lightly and use a heating pad.  - Sore Throat: treat with throat lozenges and/or gargle with warm salt   water.  - Because air was used during the procedure, expelling large amounts of air   from your rectum or belching is normal.  - If a bowel prep was taken, you may not have a bowel movement for 1-3 days.    This is normal.  SYMPTOMS TO WATCH FOR AND REPORT TO YOUR PHYSICIAN:  1. Abdominal pain or bloating, other than  gas cramps.  2. Chest pain.  3. Back pain.  4. Signs of infection such as: chills or fever occurring within 24 hours   after the procedure.  5. Rectal bleeding, which would show as bright red, maroon, or black stools.   (A tablespoon of blood from the rectum is not serious, especially if   hemorrhoids are present.)  6. Vomiting.  7. Weakness or dizziness.  GO DIRECTLY TO THE NEAREST EMERGENCY ROOM IF YOU HAVE ANY OF THE FOLLOWING:      Difficulty breathing              Chills and/or fever over 101 F   Persistent vomiting and/or vomiting blood   Severe abdominal pain   Severe chest pain   Black, tarry stools   Bleeding- more than one tablespoon   Any other symptom or condition that you feel may need urgent attention  Your doctor recommends these additional instructions:  If any biopsies were taken, your doctors clinic will contact you in 1 to 2   weeks with any results.  Oral iron daily indefinitely with parenteral iron as needed  If anemia persists despite parenteral iron, I will plan an upper single   balloon enteroscopy  Discharge to home  Resume previous diet and medications  Condition stable   The signs and symptoms of potential delayed complications were discussed   with the patient. If signs or symptoms of these complications develop, call   the [facility] at 1 [phone].   Return to normal activities tomorrow.  Written discharge instructions were   provided to the patient.  For questions, problems or results please call your physician - Thomas Schreiber MD.  EMERGENCY PHONE NUMBER: [phone],  LAB RESULTS: [phone]  IF A COMPLICATION OR EMERGENCY SITUATION ARISES AND YOU ARE UNABLE TO REACH   YOUR PHYSICIAN - GO DIRECTLY TO THE EMERGENCY ROOM.  MD Thomas Montenegro MD  11/28/2022 12:09:04 PM  This report has been verified and signed electronically.  Dear patient,  As a result of recent federal legislation (The Federal Cures Act), you may   receive lab or pathology  results from your procedure in your [facility]   account before your physician is able to contact you. Your physician or   their representative will relay the results to you with their   recommendations at their soonest availability.  Thank you,  PROVATION

## 2022-11-28 NOTE — PROVATION PATIENT INSTRUCTIONS
Discharge Summary/Instructions after an Endoscopic Procedure  Patient Name: Campbell Costa  Patient MRN: [id number]  Patient YOB: 1950 Monday, November 28, 2022  Thomas Schreiber MD  Dear patient,  As a result of recent federal legislation (The Federal Cures Act), you may   receive lab or pathology results from your procedure in your [facility]   account before your physician is able to contact you. Your physician or   their representative will relay the results to you with their   recommendations at their soonest availability.  Thank you,  Your health is very important to us during the Covid Crisis. Following your   procedure today, you will receive a daily text for 2 weeks asking about   signs or symptoms of Covid 19.  Please respond to this text when you   receive it so we can follow up and keep you as safe as possible.   RESTRICTIONS:  During your procedure today, you received medications for sedation.  These   medications may affect your judgment, balance and coordination.  Therefore,   for 24 hours, you have the following restrictions:   - DO NOT drive a car, operate machinery, make legal/financial decisions,   sign important papers or drink alcohol.    ACTIVITY:  Today: no heavy lifting, straining or running due to procedural   sedation/anesthesia.  The following day: return to full activity including work.  DIET:  Eat and drink normally unless instructed otherwise.     TREATMENT FOR COMMON SIDE EFFECTS:  - Mild abdominal pain, nausea, belching, bloating or excessive gas:  rest,   eat lightly and use a heating pad.  - Sore Throat: treat with throat lozenges and/or gargle with warm salt   water.  - Because air was used during the procedure, expelling large amounts of air   from your rectum or belching is normal.  - If a bowel prep was taken, you may not have a bowel movement for 1-3 days.    This is normal.  SYMPTOMS TO WATCH FOR AND REPORT TO YOUR PHYSICIAN:  1. Abdominal pain or bloating, other than  gas cramps.  2. Chest pain.  3. Back pain.  4. Signs of infection such as: chills or fever occurring within 24 hours   after the procedure.  5. Rectal bleeding, which would show as bright red, maroon, or black stools.   (A tablespoon of blood from the rectum is not serious, especially if   hemorrhoids are present.)  6. Vomiting.  7. Weakness or dizziness.  GO DIRECTLY TO THE NEAREST EMERGENCY ROOM IF YOU HAVE ANY OF THE FOLLOWING:      Difficulty breathing              Chills and/or fever over 101 F   Persistent vomiting and/or vomiting blood   Severe abdominal pain   Severe chest pain   Black, tarry stools   Bleeding- more than one tablespoon   Any other symptom or condition that you feel may need urgent attention  Your doctor recommends these additional instructions:  If any biopsies were taken, your doctors clinic will contact you in 1 to 2   weeks with any results.  Please see EGD report for recommendations on management of anemia  Repeat colonoscopy in 10 years for surveillance if deemed appropriate with   primary GI provider  Discharge to home  Resume previous diet and medications  Condition stable   The signs and symptoms of potential delayed complications were discussed   with the patient. If signs or symptoms of these complications develop, call   the [facility] at [phone].   Return to normal activities tomorrow.  Written discharge instructions were   provided to the patient.  For questions, problems or results please call your physician - Thomas Schreiber MD.  EMERGENCY PHONE NUMBER: [phone],  LAB RESULTS: [phone]  IF A COMPLICATION OR EMERGENCY SITUATION ARISES AND YOU ARE UNABLE TO REACH   YOUR PHYSICIAN - GO DIRECTLY TO THE EMERGENCY ROOM.  MD Thomas Montenegro MD  11/28/2022 12:30:20 PM  This report has been verified and signed electronically.  Dear patient,  As a result of recent federal legislation (The Federal Cures Act), you may   receive lab or  pathology results from your procedure in your [email or web address]   account before your physician is able to contact you. Your physician or   their representative will relay the results to you with their   recommendations at their soonest availability.  Thank you,  PROVATION

## 2022-11-28 NOTE — ANESTHESIA POSTPROCEDURE EVALUATION
Anesthesia Post Evaluation    Patient: Campbell Costa    Procedure(s) Performed: Procedure(s) (LRB):  COLONOSCOPY (N/A)  EGD (ESOPHAGOGASTRODUODENOSCOPY)with push (N/A)    Final Anesthesia Type: general      Patient location during evaluation: GI PACU  Patient participation: Yes- Able to Participate  Level of consciousness: awake and alert  Post-procedure vital signs: reviewed and stable  Pain management: adequate  Airway patency: patent    PONV status at discharge: No PONV  Anesthetic complications: no      Cardiovascular status: hemodynamically stable, stable and blood pressure returned to baseline  Respiratory status: unassisted, spontaneous ventilation and room air  Hydration status: euvolemic  Follow-up not needed.          Vitals Value Taken Time   /68 11/28/22 1206   Temp 98.1 11/28/22 1206   Pulse 72 11/28/22 1206   Resp 12 11/28/22 1206   SpO2 96% 11/28/22 1206         No case tracking events are documented in the log.      Pain/Chon Score: No data recorded

## 2022-11-28 NOTE — ANESTHESIA PREPROCEDURE EVALUATION
11/28/2022  Campbell Costa is a 72 y.o., male.      Pre-op Assessment    I have reviewed the Patient Summary Reports.    I have reviewed the NPO Status.   I have reviewed the Medications.     Review of Systems  Anesthesia Hx:  No problems with previous Anesthesia  Denies Family Hx of Anesthesia complications.   Denies Personal Hx of Anesthesia complications.   Hematology/Oncology:     Oncology Normal    -- Anemia:   EENT/Dental:EENT/Dental Normal   Cardiovascular:   Hypertension    Pulmonary:   COPD Sleep Apnea    Renal/:  Renal/ Normal     Hepatic/GI:  Hepatic/GI Normal    Musculoskeletal:  Musculoskeletal Normal    Neurological:   CVA Seizures    Endocrine:   Diabetes    Dermatological:  Skin Normal    Psych:  Psychiatric Normal           Physical Exam  General: Well nourished, Cooperative and Alert    Airway:  Mallampati: II   Mouth Opening: Normal  TM Distance: Normal  Tongue: Normal  Neck ROM: Normal ROM    Chest/Lungs:  Clear to auscultation, Normal Respiratory Rate    Heart:  Rate: Normal  Rhythm: Regular Rhythm  Sounds: Normal        Anesthesia Plan  Type of Anesthesia, risks & benefits discussed:    Anesthesia Type: MAC  Intra-op Monitoring Plan: Standard ASA Monitors  Informed Consent: Informed consent signed with the Patient and all parties understand the risks and agree with anesthesia plan.  All questions answered. Patient consented to blood products? No  ASA Score: 3  Day of Surgery Review of History & Physical: H&P completed by Anesthesiologist.    Ready For Surgery From Anesthesia Perspective.     .

## 2022-11-28 NOTE — TRANSFER OF CARE
"Anesthesia Transfer of Care Note    Patient: Campbell Costa    Procedure(s) Performed: Procedure(s) (LRB):  COLONOSCOPY (N/A)  EGD (ESOPHAGOGASTRODUODENOSCOPY)with push (N/A)    Patient location: GI    Anesthesia Type: general    Transport from OR: Transported from OR on room air with adequate spontaneous ventilation    Post pain: adequate analgesia    Post assessment: no apparent anesthetic complications    Post vital signs: stable    Level of consciousness: responds to stimulation    Nausea/Vomiting: no nausea/vomiting    Complications: none    Transfer of care protocol was followed      Last vitals:   Visit Vitals  /68 (BP Location: Left arm, Patient Position: Lying)   Pulse 72   Temp 98.1   Resp 12   Ht 5' 8" (1.727 m)   Wt 84.4 kg (186 lb)   SpO2 96%   BMI 28.28 kg/m²     "

## 2022-11-28 NOTE — H&P
[facility]    CC: Iron Deficiency anemia    HPI 72 y.o. male w/ COPD, GERD, HLD, HTN, CVA on plavix who presents for evaluation of GRAEME.     Majority of history provided by wife due to neurologic deficits. Reported longstanding anemia, at least 20 years, has required transfusions a few times, most recently was several years ago. Notes fairly frequent nosebleeds but never requiring cauterization. Has given blood before but almost ten years ago. Stools are always black due to oral iron but they are intermittently tarry. He has intermittent diffuse abdominal pain and nausea. He has never been on parenteral iron. No abdominal surgeries.    Past Medical History  Past Medical History:   Diagnosis Date    Anxiety     COPD (chronic obstructive pulmonary disease)     Diabetes mellitus     GERD (gastroesophageal reflux disease)     Hereditary acanthocytosis     Hypercholesterolemia     Hypertension     Iron deficiency anemia     Seizures     Stroke          Physical Examination  There were no vitals taken for this visit.  General appearance: alert, cooperative, no distress  Lungs: clear to auscultation bilaterally, no dullness to percussion bilaterally  Heart: regular rate and rhythm without rub; no displacement of the PMI   Abdomen: soft, non-tender; bowel sounds normoactive; no organomegaly  Neuro: chronic neurologic defecits from prior CVA noted    Assessment:   72M with history of CVA on ASA, COPD, GERD, HTN, HLD who presents for evaluation of longstanding GRAEME of unclear etiology. Prior unrevealing EGD/colon in 2019 but needs updated dual endoscopy prior to considering VCE.    Plan:  - Proceed with repeat colonoscopy and EGD with push (with PCF) with mapping biopsies, gastric pH, and celiac biopsies given reported longstanding anemia, if negative a VCE is the next step    Thomas Schreiber MD   [address] [place] [address], [address]   [place] [address] [phone]

## 2022-11-29 ENCOUNTER — TELEPHONE (OUTPATIENT)
Dept: ENDOSCOPY | Facility: HOSPITAL | Age: 72
End: 2022-11-29
Payer: MEDICAID

## 2022-11-29 PROBLEM — D50.0 ANEMIA DUE TO CHRONIC BLOOD LOSS: Status: ACTIVE | Noted: 2022-11-29

## 2022-11-29 LAB — POCT GLUCOSE: 131 MG/DL (ref 70–110)
